# Patient Record
Sex: MALE | Race: WHITE | NOT HISPANIC OR LATINO | Employment: OTHER | URBAN - METROPOLITAN AREA
[De-identification: names, ages, dates, MRNs, and addresses within clinical notes are randomized per-mention and may not be internally consistent; named-entity substitution may affect disease eponyms.]

---

## 2018-04-07 ENCOUNTER — OFFICE VISIT (OUTPATIENT)
Dept: URGENT CARE | Facility: URGENT CARE | Age: 83
End: 2018-04-07
Payer: COMMERCIAL

## 2018-04-07 VITALS
OXYGEN SATURATION: 94 % | TEMPERATURE: 98.3 F | SYSTOLIC BLOOD PRESSURE: 152 MMHG | HEART RATE: 71 BPM | WEIGHT: 215 LBS | BODY MASS INDEX: 32.69 KG/M2 | DIASTOLIC BLOOD PRESSURE: 82 MMHG

## 2018-04-07 DIAGNOSIS — R07.0 THROAT PAIN: Primary | ICD-10-CM

## 2018-04-07 LAB
DEPRECATED S PYO AG THROAT QL EIA: NORMAL
SPECIMEN SOURCE: NORMAL

## 2018-04-07 PROCEDURE — 99203 OFFICE O/P NEW LOW 30 MIN: CPT | Performed by: NURSE PRACTITIONER

## 2018-04-07 PROCEDURE — 87880 STREP A ASSAY W/OPTIC: CPT | Performed by: NURSE PRACTITIONER

## 2018-04-07 PROCEDURE — 87081 CULTURE SCREEN ONLY: CPT | Performed by: NURSE PRACTITIONER

## 2018-04-07 ASSESSMENT — ENCOUNTER SYMPTOMS
SORE THROAT: 1
HEARTBURN: 0
COUGH: 1
FEVER: 0

## 2018-04-07 NOTE — PROGRESS NOTES
HPI      Chief Complaint   Patient presents with     Urgent Care     Pharyngitis     sore throat,cold     2 nights ago started with ST  Last night pain was bad  This morning is a lot better  No fevers  No cough   No heartburn   This sore throat is common for his annual cold. Hasn't had a cold yet this year       Past Medical History:   Diagnosis Date     Arthritis      Past Surgical History:   Procedure Laterality Date     BACK SURGERY       Social History   Substance Use Topics     Smoking status: Never Smoker     Smokeless tobacco: Not on file     Alcohol use Yes      Comment: 2-3 drinks a week     Current Outpatient Prescriptions   Medication Sig Dispense Refill     ZOLPIDEM TARTRATE PO Take 5 mg by mouth.       ASPIRIN PO Take 81 mg by mouth.       multivitamin, therapeutic with minerals (MULTI-VITAMIN) TABS Take 1 tablet by mouth daily.       GLUCOSAMINE SULFATE PO Take  by mouth.       No Known Allergies    Reviewed and updated as needed this visit by clinical staff and provider       Review of Systems   Constitutional: Negative for fever.   HENT: Positive for sore throat. Negative for congestion.    Respiratory: Positive for cough.    Gastrointestinal: Negative for heartburn.         /82  Pulse 71  Temp 98.3  F (36.8  C) (Oral)  Wt 215 lb (97.5 kg)  SpO2 94%  BMI 32.69 kg/m2      Physical Exam   Constitutional: He is well-developed, well-nourished, and in no distress.   HENT:   Head: Normocephalic.   Right Ear: Tympanic membrane, external ear and ear canal normal.   Left Ear: Tympanic membrane, external ear and ear canal normal.   Mouth/Throat: Posterior oropharyngeal erythema present. No oropharyngeal exudate.   Mild uvular swelling   Eyes: Conjunctivae are normal.   Neck: Normal range of motion.   Cardiovascular: Normal rate, regular rhythm and normal heart sounds.    No murmur heard.  Pulmonary/Chest: Effort normal and breath sounds normal. No respiratory distress.   Musculoskeletal: Normal  range of motion.   Lymphadenopathy:     He has no cervical adenopathy.   Neurological: He is alert.   Skin: Skin is warm and dry.   Psychiatric: Mood and affect normal.   Vitals reviewed.      Assessment and Plan:       ICD-10-CM    1. Throat pain R07.0 Rapid strep screen     Beta strep group A culture       I suspect throat pain today is viral. Neg RST. Pain is better today which is reassuring. Take tylenol prn. Push fluids. Warm salt water gargles. F/u with pcp if symptoms worsen.       Kate Gallo, APRN, CNP  [facility]

## 2018-04-07 NOTE — MR AVS SNAPSHOT
"              After Visit Summary   2018    José Miguel Hightower    MRN: [id number]           Patient Information     Date Of Birth          1935        Visit Information        Provider Department      2018 8:05 AM Kate Gallo APRN CNP [facility]        Today's Diagnoses     Throat pain    -  1       Follow-ups after your visit        Who to contact     If you have questions or need follow up information about today's clinic visit or your schedule please contact [facility] directly at [phone].  Normal or non-critical lab and imaging results will be communicated to you by [employer], letter or phone within 4 business days after the clinic has received the results. If you do not hear from us within 7 days, please contact the clinic through Zopah[employer] or phone. If you have a critical or abnormal lab result, we will notify you by phone as soon as possible.  Submit refill requests through [employer] or call your pharmacy and they will forward the refill request to us. Please allow 3 business days for your refill to be completed.          Additional Information About Your Visit        MyChart Information     [employer] lets you send messages to your doctor, view your test results, renew your prescriptions, schedule appointments and more. To sign up, go to [email or web address] . Click on \"Log in\" on the left side of the screen, which will take you to the Welcome page. Then click on \"Sign up Now\" on the right side of the page.     You will be asked to enter the access code listed below, as well as some personal information. Please follow the directions to create your username and password.     Your access code is: [email or web address]  Expires: 2018  8:49 AM     Your access code will  in 90 days. If you need help or a new code, please call your [facility] or [phone].        Care EveryWhere ID     This is your Care EveryWhere ID. This could be used " by other organizations to access your [place] medical records  [id number]        Your Vitals Were     Pulse Temperature Pulse Oximetry BMI (Body Mass Index)          71 98.3  F (36.8  C) (Oral) 94% 32.69 kg/m2         Blood Pressure from Last 3 Encounters:   04/07/18 152/82   03/06/13 127/81    Weight from Last 3 Encounters:   04/07/18 215 lb (97.5 kg)   03/06/13 222 lb (100.7 kg)              We Performed the Following     Beta strep group A culture     Rapid strep screen        Primary Care Provider Office Phone # Fax #    [facility] [phone]       XXX RETIRED  [address] [facility] [address]        Equal Access to Services     ALESIA DEL CID : Hadii imtiaz ferrello Soquynh, waaxda luqadaha, qaybta kaalmada adeegyada, kayla ryan . So [facility] [phone].    ATENCIÓN: Si habla español, tiene a tafoya disposición servicios gratuitos de asistencia lingüística. [facility] [phone].    We comply with applicable federal civil rights laws and [place] laws. We do not discriminate on the basis of race, color, national origin, age, disability, sex, sexual orientation, or gender identity.            Thank you!     Thank you for choosing [facility]ARE  for your care. Our goal is always to provide you with excellent care. Hearing back from our patients is one way we can continue to improve our services. Please take a few minutes to complete the written survey that you may receive in the mail after your visit with us. Thank you!             Your Updated Medication List - Protect others around you: Learn how to safely use, store and throw away your medicines at www.disposemymeds.org.          This list is accurate as of 4/7/18  8:49 AM.  Always use your most recent med list.                   Brand Name Dispense Instructions for use Diagnosis    ASPIRIN PO      Take 81 mg by mouth.        GLUCOSAMINE SULFATE PO      Take  by mouth.         Multi-vitamin Tabs tablet      Take 1 tablet by mouth daily.        ZOLPIDEM TARTRATE PO      Take 5 mg by mouth.

## 2018-04-07 NOTE — NURSING NOTE
"Chief Complaint   Patient presents with     Urgent Care     Pharyngitis     sore throat,cold       Initial /82  Pulse 71  Temp 98.3  F (36.8  C) (Oral)  Wt 215 lb (97.5 kg)  SpO2 94%  BMI 32.69 kg/m2 Estimated body mass index is 32.69 kg/(m^2) as calculated from the following:    Height as of 3/6/13: 5' 8\" (1.727 m).    Weight as of this encounter: 215 lb (97.5 kg).  Medication Reconciliation: complete   Toña DAMIAN MA       "

## 2018-04-08 LAB
BACTERIA SPEC CULT: NORMAL
SPECIMEN SOURCE: NORMAL

## 2020-03-06 ENCOUNTER — HOSPITAL ENCOUNTER (OUTPATIENT)
Facility: CLINIC | Age: 85
End: 2020-03-06
Attending: ORTHOPAEDIC SURGERY | Admitting: ORTHOPAEDIC SURGERY
Payer: COMMERCIAL

## 2020-10-25 DIAGNOSIS — Z11.59 ENCOUNTER FOR SCREENING FOR OTHER VIRAL DISEASES: Primary | ICD-10-CM

## 2021-05-17 ENCOUNTER — HOSPITAL ENCOUNTER (OUTPATIENT)
Facility: CLINIC | Age: 86
End: 2021-05-17
Attending: ORTHOPAEDIC SURGERY | Admitting: ORTHOPAEDIC SURGERY
Payer: MEDICARE

## 2021-06-13 DIAGNOSIS — Z11.59 ENCOUNTER FOR SCREENING FOR OTHER VIRAL DISEASES: ICD-10-CM

## 2021-07-29 ENCOUNTER — HOSPITAL ENCOUNTER (OUTPATIENT)
Facility: CLINIC | Age: 86
End: 2021-07-29
Attending: ORTHOPAEDIC SURGERY | Admitting: ORTHOPAEDIC SURGERY
Payer: MEDICARE

## 2021-08-03 NOTE — PLAN OF CARE
Your Elective TKA Patient:    Patient Name:  José Miguel Hightower    : 1935    MRN: [id number]    Surgeon: LIMA Winter    DOS/Procedure:  2021/TKA    Hospital: [facility]    Insurance - [employer] plan    PCP- Brianna Boyer MD         Assessment Findings: Will update with additional concerns from the H&P as indicated  Medical              Age 85 (1 pt)              BMI=31.78              TABITHA (1 pt)              Paroxysmal A Fib - managed with Sotalol              Functional              Currently Ambulation - Ambulates with Cane              Met score - limited activity related to pain in both knees              Falls - o         Living Situation              Stairs to enter home -   2 story home - steps to bedroom (3 steps into home - 11 steps to second floor to bedroom              Bathroom setup - Tub shower - grab bars - raised toilet              Post Op Support/Resources              Support -  lives with spouse- but she is unable to help him - he does all the cooking and cleaning              Transportation - Son will proved transportation         Discharge Disposition              TCU    1.     [facility] -wants private - he is aware of the private room fee    2.     [facility]              Please direct questions to:    Zena Manriquez RN         [facility]    [place]    [address]   [place] [address]    T: [phone]    C. [phone]    F: [phone]    E: [email or web address]

## 2021-10-22 ENCOUNTER — HOSPITAL ENCOUNTER (OUTPATIENT)
Facility: CLINIC | Age: 86
End: 2021-10-22
Attending: ORTHOPAEDIC SURGERY | Admitting: ORTHOPAEDIC SURGERY
Payer: COMMERCIAL

## 2021-10-29 NOTE — PLAN OF CARE
Your Elective TKA Patient:  Patient Name:  José Miguel Hightower  : 1935  MRN: [id number]  Surgeon: LIMA Winter  DOS/Procedure:  2021/TKA  Hospital: [facility]  Insurance - [employer] plan  PCP- Brianna Boyer MD     Assessment Findings: Will update with additional concerns from the H&P as indicated  Medical            Age 85 (1 pt)            BMI=31.78            TABITHA (1 pt)            Paroxysmal A Fib - managed with Sotalol        Functional            Currently Ambulation - Ambulates with Cane            Met score - limited activity related to pain in both knees            Falls - o     Living Situation            Stairs to enter home -   2 story home - steps to bedroom (3 steps into home - 11 steps to second floor to bedroom            Bathroom setup - Tub shower - grab bars - raised toilet        Post Op Support/Resources            Support -  lives with spouse- but she is unable to help him - he does all the cooking and cleaning            Transportation - Son will proved transportation     Discharge Disposition            TCU  1.     [facility] -wants private - he is aware of the private room fee  2.     Inter[facility]ude [facility]    Please direct questions or concerns to:  Zena Manriquez RN  Trauma Coordinator     [facility]  [place]  [address]   [place] [address]  T: [phone]  C. [phone]  F: [phone]  E: [email or web address]

## 2021-11-16 DIAGNOSIS — Z11.59 ENCOUNTER FOR SCREENING FOR OTHER VIRAL DISEASES: ICD-10-CM

## 2022-02-14 ENCOUNTER — APPOINTMENT (OUTPATIENT)
Dept: URBAN - METROPOLITAN AREA CLINIC 256 | Age: 87
Setting detail: DERMATOLOGY
End: 2022-02-14

## 2022-02-14 VITALS — RESPIRATION RATE: 16 BRPM | HEIGHT: 68 IN | WEIGHT: 200 LBS

## 2022-02-14 DIAGNOSIS — Z85.828 PERSONAL HISTORY OF OTHER MALIGNANT NEOPLASM OF SKIN: ICD-10-CM

## 2022-02-14 DIAGNOSIS — D69.2 OTHER NONTHROMBOCYTOPENIC PURPURA: ICD-10-CM

## 2022-02-14 PROCEDURE — OTHER COUNSELING: OTHER

## 2022-02-14 PROCEDURE — OTHER ADDITIONAL NOTES: OTHER

## 2022-02-14 PROCEDURE — 99202 OFFICE O/P NEW SF 15 MIN: CPT

## 2022-02-14 ASSESSMENT — LOCATION SIMPLE DESCRIPTION DERM: LOCATION SIMPLE: RIGHT HAND

## 2022-02-14 ASSESSMENT — LOCATION ZONE DERM: LOCATION ZONE: HAND

## 2022-02-14 ASSESSMENT — LOCATION DETAILED DESCRIPTION DERM: LOCATION DETAILED: RIGHT ULNAR DORSAL HAND

## 2022-02-14 NOTE — HPI: SKIN LESION
Additional History: Patient had Bx at outside clinic at [facility] with Dr. Britt. Biopsy positive for SCC. Patient referred for Mohs surgery with Dr. Brito. Patient states he is unhappy and is here for a 2nd opinion.

## 2022-02-14 NOTE — PROCEDURE: ADDITIONAL NOTES
Detail Level: Zone
Additional Notes: Patient had Bx at outside clinic at [facility] with Dr. Britt. Biopsy positive for SCC. Patient referred for Mohs surgery with Dr. Brito. Patient states he is unhappy and is here for a 2nd opinion.
Render Risk Assessment In Note?: no

## 2022-05-16 ENCOUNTER — HOSPITAL ENCOUNTER (OUTPATIENT)
Facility: CLINIC | Age: 87
End: 2022-05-16
Attending: ORTHOPAEDIC SURGERY | Admitting: ORTHOPAEDIC SURGERY
Payer: COMMERCIAL

## 2022-05-17 NOTE — PLAN OF CARE
José Miguel Hightower   : 39   Scheduled for Rt TKA on 22 with Dr. Bravo.     He has requested [facility], and I have sent this request through Lisa. He states he has family to stay with him, but they re unable to drive him to/from appts etc.     Please let me know if you need anything further from me.    Thank you,     Carlene Betancur  Care Coordinator to Dr. Bravo    [facility]  [address] [address]  T: [phone]  E: [email or web address]

## 2022-06-29 ENCOUNTER — HOSPITAL ENCOUNTER (OUTPATIENT)
Facility: CLINIC | Age: 87
End: 2022-06-29
Attending: ORTHOPAEDIC SURGERY | Admitting: ORTHOPAEDIC SURGERY
Payer: COMMERCIAL

## 2022-10-23 ENCOUNTER — HEALTH MAINTENANCE LETTER (OUTPATIENT)
Age: 87
End: 2022-10-23

## 2023-08-31 ENCOUNTER — HOSPITAL ENCOUNTER (OUTPATIENT)
Facility: CLINIC | Age: 88
End: 2023-08-31
Attending: ORTHOPAEDIC SURGERY | Admitting: ORTHOPAEDIC SURGERY
Payer: COMMERCIAL

## 2023-11-05 ENCOUNTER — HEALTH MAINTENANCE LETTER (OUTPATIENT)
Age: 88
End: 2023-11-05

## 2023-11-21 ENCOUNTER — HOSPITAL ENCOUNTER (OUTPATIENT)
Facility: CLINIC | Age: 88
Discharge: HOME | End: 2023-11-21
Attending: ORTHOPAEDIC SURGERY | Admitting: ORTHOPAEDIC SURGERY
Payer: COMMERCIAL

## 2023-12-06 ENCOUNTER — HOSPITAL ENCOUNTER (EMERGENCY)
Facility: CLINIC | Age: 88
Discharge: HOME | End: 2023-12-06
Attending: STUDENT IN AN ORGANIZED HEALTH CARE EDUCATION/TRAINING PROGRAM | Admitting: STUDENT IN AN ORGANIZED HEALTH CARE EDUCATION/TRAINING PROGRAM
Payer: COMMERCIAL

## 2023-12-06 VITALS
TEMPERATURE: 97.7 F | RESPIRATION RATE: 16 BRPM | HEART RATE: 92 BPM | SYSTOLIC BLOOD PRESSURE: 122 MMHG | DIASTOLIC BLOOD PRESSURE: 64 MMHG | OXYGEN SATURATION: 92 %

## 2023-12-06 DIAGNOSIS — K52.9 ACUTE GASTROENTERITIS: ICD-10-CM

## 2023-12-06 LAB
ALBUMIN SERPL BCG-MCNC: 4 G/DL (ref 3.5–5.2)
ALP SERPL-CCNC: 125 U/L (ref 40–150)
ALT SERPL W P-5'-P-CCNC: 67 U/L (ref 0–70)
ANION GAP SERPL CALCULATED.3IONS-SCNC: 14 MMOL/L (ref 7–15)
AST SERPL W P-5'-P-CCNC: 57 U/L (ref 0–45)
BASOPHILS # BLD AUTO: 0 10E3/UL (ref 0–0.2)
BASOPHILS NFR BLD AUTO: 0 %
BILIRUB SERPL-MCNC: 1.1 MG/DL
BUN SERPL-MCNC: 21.1 MG/DL (ref 8–23)
CALCIUM SERPL-MCNC: 8.8 MG/DL (ref 8.8–10.2)
CHLORIDE SERPL-SCNC: 105 MMOL/L (ref 98–107)
CREAT SERPL-MCNC: 1.05 MG/DL (ref 0.67–1.17)
DEPRECATED HCO3 PLAS-SCNC: 22 MMOL/L (ref 22–29)
EGFRCR SERPLBLD CKD-EPI 2021: 70 ML/MIN/1.73M2
EOSINOPHIL # BLD AUTO: 0 10E3/UL (ref 0–0.7)
EOSINOPHIL NFR BLD AUTO: 0 %
ERYTHROCYTE [DISTWIDTH] IN BLOOD BY AUTOMATED COUNT: 13.5 % (ref 10–15)
GLUCOSE SERPL-MCNC: 179 MG/DL (ref 70–99)
HCT VFR BLD AUTO: 48.7 % (ref 40–53)
HGB BLD-MCNC: 16.4 G/DL (ref 13.3–17.7)
HOLD SPECIMEN: NORMAL
IMM GRANULOCYTES # BLD: 0 10E3/UL
IMM GRANULOCYTES NFR BLD: 0 %
LIPASE SERPL-CCNC: 17 U/L (ref 13–60)
LYMPHOCYTES # BLD AUTO: 0.1 10E3/UL (ref 0.8–5.3)
LYMPHOCYTES NFR BLD AUTO: 2 %
MAGNESIUM SERPL-MCNC: 1.9 MG/DL (ref 1.7–2.3)
MCH RBC QN AUTO: 31.4 PG (ref 26.5–33)
MCHC RBC AUTO-ENTMCNC: 33.7 G/DL (ref 31.5–36.5)
MCV RBC AUTO: 93 FL (ref 78–100)
MONOCYTES # BLD AUTO: 0.4 10E3/UL (ref 0–1.3)
MONOCYTES NFR BLD AUTO: 6 %
NEUTROPHILS # BLD AUTO: 5.7 10E3/UL (ref 1.6–8.3)
NEUTROPHILS NFR BLD AUTO: 92 %
NRBC # BLD AUTO: 0 10E3/UL
NRBC BLD AUTO-RTO: 0 /100
PLATELET # BLD AUTO: 128 10E3/UL (ref 150–450)
POTASSIUM SERPL-SCNC: 4.3 MMOL/L (ref 3.4–5.3)
PROT SERPL-MCNC: 7 G/DL (ref 6.4–8.3)
RBC # BLD AUTO: 5.22 10E6/UL (ref 4.4–5.9)
SODIUM SERPL-SCNC: 141 MMOL/L (ref 135–145)
WBC # BLD AUTO: 6.3 10E3/UL (ref 4–11)

## 2023-12-06 PROCEDURE — 99284 EMERGENCY DEPT VISIT MOD MDM: CPT | Mod: 25

## 2023-12-06 PROCEDURE — 80053 COMPREHEN METABOLIC PANEL: CPT | Performed by: STUDENT IN AN ORGANIZED HEALTH CARE EDUCATION/TRAINING PROGRAM

## 2023-12-06 PROCEDURE — 96374 THER/PROPH/DIAG INJ IV PUSH: CPT

## 2023-12-06 PROCEDURE — 96361 HYDRATE IV INFUSION ADD-ON: CPT

## 2023-12-06 PROCEDURE — 36415 COLL VENOUS BLD VENIPUNCTURE: CPT | Performed by: STUDENT IN AN ORGANIZED HEALTH CARE EDUCATION/TRAINING PROGRAM

## 2023-12-06 PROCEDURE — 258N000003 HC RX IP 258 OP 636: Performed by: STUDENT IN AN ORGANIZED HEALTH CARE EDUCATION/TRAINING PROGRAM

## 2023-12-06 PROCEDURE — 83735 ASSAY OF MAGNESIUM: CPT | Performed by: STUDENT IN AN ORGANIZED HEALTH CARE EDUCATION/TRAINING PROGRAM

## 2023-12-06 PROCEDURE — 85025 COMPLETE CBC W/AUTO DIFF WBC: CPT | Performed by: STUDENT IN AN ORGANIZED HEALTH CARE EDUCATION/TRAINING PROGRAM

## 2023-12-06 PROCEDURE — 250N000011 HC RX IP 250 OP 636: Mod: JZ | Performed by: STUDENT IN AN ORGANIZED HEALTH CARE EDUCATION/TRAINING PROGRAM

## 2023-12-06 PROCEDURE — 83690 ASSAY OF LIPASE: CPT | Performed by: STUDENT IN AN ORGANIZED HEALTH CARE EDUCATION/TRAINING PROGRAM

## 2023-12-06 RX ORDER — ONDANSETRON 4 MG/1
4 TABLET, ORALLY DISINTEGRATING ORAL EVERY 8 HOURS PRN
Qty: 12 TABLET | Refills: 0 | Status: SHIPPED | OUTPATIENT
Start: 2023-12-06

## 2023-12-06 RX ORDER — ONDANSETRON 2 MG/ML
4 INJECTION INTRAMUSCULAR; INTRAVENOUS ONCE
Status: COMPLETED | OUTPATIENT
Start: 2023-12-06 | End: 2023-12-06

## 2023-12-06 RX ADMIN — ONDANSETRON 4 MG: 2 INJECTION INTRAMUSCULAR; INTRAVENOUS at 01:01

## 2023-12-06 RX ADMIN — SODIUM CHLORIDE, POTASSIUM CHLORIDE, SODIUM LACTATE AND CALCIUM CHLORIDE 1000 ML: 600; 310; 30; 20 INJECTION, SOLUTION INTRAVENOUS at 01:01

## 2023-12-06 ASSESSMENT — ACTIVITIES OF DAILY LIVING (ADL)
ADLS_ACUITY_SCORE: 35

## 2023-12-06 NOTE — ED TRIAGE NOTES
"Pt c/o nause/vomiting/diarrhea since 530PM today. Waited to call EMS \"until it wasn't busy\". EMS gave 4mg of zofran.        "

## 2023-12-06 NOTE — ED PROVIDER NOTES
History     Chief Complaint:  Nausea, Vomiting, & Diarrhea       HPI   José Miguel Hightower is a 87 year old male who presents to the ER for evaluation of nausea, vomiting, diarrhea since this afternoon around noon.  It started within 30 minutes of eating a mussel soup for lunch.  Began with nausea and nonbloody vomiting followed by several episodes of loose stool.  He has no associated abdominal pain.  No fevers, chills, chest pain, shortness of breath.  He felt generally weak so called 911 for assistance.  Lives alone.  No one else ate similar food as him.  Denies recent antibiotic use or sick contacts.      Independent Historian:   None - Patient Only    Review of External Notes:   None       Medications:    ondansetron (ZOFRAN ODT) 4 MG ODT tab  ASPIRIN PO  GLUCOSAMINE SULFATE PO  multivitamin, therapeutic with minerals (MULTI-VITAMIN) TABS  ZOLPIDEM TARTRATE PO        Past Medical History:    Past Medical History:   Diagnosis Date    Arthritis        Past Surgical History:    Past Surgical History:   Procedure Laterality Date    BACK SURGERY          Physical Exam   Patient Vitals for the past 24 hrs:   BP Temp Temp src Pulse Resp SpO2   12/06/23 0041 -- 97.7  F (36.5  C) Temporal -- 16 --   12/06/23 0037 -- -- -- -- -- 94 %   12/06/23 0036 -- -- -- -- -- 95 %   12/06/23 0035 127/62 -- -- 95 -- 94 %        Physical Exam  General: Awake, alert, in no acute distress   HEENT: Atraumatic   EOM normal   External ears normal   Trachea midline  Dry mucous membranes  Neck: Supple, normal ROM  CV: Regular rate, regular rhythm   No murmur   No lower extremity edema  2+ radial and DP pulses  PULM: Breath sounds normal bilaterally  No wheezes or rales  ABD: Soft, non-tender, non-distended  Normal bowel sounds   No rebound or guarding   MSK: No gross deformities  NEURO: Alert, no focal deficits  Skin: Warm, dry and intact      Emergency Department Course     Laboratory:  Labs Ordered and Resulted from Time of ED Arrival to  Time of ED Departure   COMPREHENSIVE METABOLIC PANEL - Abnormal       Result Value    Sodium 141      Potassium 4.3      Carbon Dioxide (CO2) 22      Anion Gap 14      Urea Nitrogen 21.1      Creatinine 1.05      GFR Estimate 70      Calcium 8.8      Chloride 105      Glucose 179 (*)     Alkaline Phosphatase 125      AST 57 (*)     ALT 67      Protein Total 7.0      Albumin 4.0      Bilirubin Total 1.1     CBC WITH PLATELETS AND DIFFERENTIAL - Abnormal    WBC Count 6.3      RBC Count 5.22      Hemoglobin 16.4      Hematocrit 48.7      MCV 93      MCH 31.4      MCHC 33.7      RDW 13.5      Platelet Count 128 (*)     % Neutrophils 92      % Lymphocytes 2      % Monocytes 6      % Eosinophils 0      % Basophils 0      % Immature Granulocytes 0      NRBCs per 100 WBC 0      Absolute Neutrophils 5.7      Absolute Lymphocytes 0.1 (*)     Absolute Monocytes 0.4      Absolute Eosinophils 0.0      Absolute Basophils 0.0      Absolute Immature Granulocytes 0.0      Absolute NRBCs 0.0     LIPASE - Normal    Lipase 17     MAGNESIUM - Normal    Magnesium 1.9          Procedures   None    Emergency Department Course & Assessments:             Interventions:  Medications   ondansetron (ZOFRAN) injection 4 mg (4 mg Intravenous $Given 12/6/23 0101)   lactated ringers BOLUS 1,000 mL (1,000 mLs Intravenous $New Bag 12/6/23 0101)        Assessments:  See ED course     Independent Interpretation (X-rays, CTs, rhythm strip):  None    Consultations/Discussion of Management or Tests:  None   ED Course as of 12/06/23 0214   Wed Dec 06, 2023   0201 Reevaluation.  Patient feeling improved.  Tolerating water.  Will give him crackers.  Discussed that I want him to finish the bag of fluids and then try to get up and walk to make sure he feels steady on his feet.       Social Determinants of Health affecting care:   None    Disposition:  Care of the patient was transferred to my colleague Dr. Camara pending completion of fluids and road test.      Impression & Plan    CMS Diagnoses: None    Medical Decision Making:  Vital stable on arrival.  This is a fairly healthy 87-year-old gentleman who presents with what I suspect is food poisoning given onset of symptoms shortly after eating a seafood soup.  Considered other etiologies such as viral syndrome, diverticulitis, atypical ACS.  Doubt ACS given diarrheal component as well as no chest pain.  Abdominal exam is benign.  Labs show lymphopenia and a mild elevation in AST are otherwise reassuring.  No recent antibiotic use to suggest C. difficile. No urinary symptoms    Patient did appear clinically dehydrated so I want him to finish the liter of fluids.  Thereafter, he will need a road test to make sure he is not symptomatically orthostatic prior to safe discharge home given that the patient lives alone.  He is tolerating p.o. after Zofran I will send an Rx to his preferred pharmacy should he be able to discharge.  If he fails road test, could trial more fluids versus admitted under observation.  Will sign out to my colleague Dr. Camara.      Diagnosis:    ICD-10-CM    1. Acute gastroenteritis  K52.9            Discharge Medications:  New Prescriptions    ONDANSETRON (ZOFRAN ODT) 4 MG ODT TAB    Take 1 tablet (4 mg) by mouth every 8 hours as needed for nausea or vomiting          Christine Barber, DO  12/6/2023   Christine Hernandez, DO        Christine Hernandez, DO  12/06/23 0207       Christine Hernandez, DO  12/06/23 0214

## 2023-12-06 NOTE — ED PROVIDER NOTES
I received patient in signout from Dr. Jacobsen.  Please refer to their complete H&P for further information.  Briefly, patient presented with nausea, vomiting and diarrhea that was concerning for possible food poisoning.  At time of signout, reassessment after parenteral fluid hydration was pending.     Patient finished the parenteral fluids.  He ambulated and reports that he felt steady on his feet, did not have lightheadedness and felt comfortable with discharge home.  He did agree that he would return if he has any of those symptoms or becomes worse.  We help to arrange for wheelchair transport back to his home.  Dr. Jacobsen had already written for antiemetics for him as a home medication.  At this time, with reasonable clinical confidence, I do believe he safe for discharge.     Pauly Camara MD  12/06/23 035

## 2023-12-06 NOTE — ED NOTES
Walked pt down [place]ag[place] about 5 meters. Pt Tolerated well with walker no tech assistance was needed. Pulse 107-109. O2 92%.

## 2023-12-06 NOTE — DISCHARGE INSTRUCTIONS
*Drink plenty of fluids and advance diet slowly.  *Take medications as prescribed.  Zofran for nausea. Continue your current medications.  *Follow-up with your doctor for a recheck in 2-3 days.  *Return if you are unable to keep fluids down, develop severe abdominal pain, faint or feel like you will faint or become worse in any way.    Discharge Instructions  Gastroenteritis    You have been seen today for vomiting and diarrhea, called gastroenteritis or the stomach flu. This is usually caused by a virus, but some bacteria, parasites, medicines or other medical conditions can cause similar symptoms. At this time your doctor does not find that your vomiting and diarrhea is a sign of anything dangerous or life-threatening.  However, sometimes the signs of serious illness do not show up right away.  If you have new or worse symptoms, you may need to be seen again in the emergency department or by your primary doctor. Remember that serious problems like appendicitis can look like gastroenteritis at first.       Return to the Emergency Department if:  You keep throwing up and you are not able to keep liquids down.  You feel you are getting dehydrated, such as being very thirsty, not urinating at least every 8-12 hours, or feeling faint or lightheaded.   You develop a new fever, or your fever continues for more than 2 days.  You have belly pain that seems worse than cramps, is in one spot, or is getting worse over time.   You have blood in your vomit or in your diarrhea.  You feel very weak   You are not starting to improve within 24 hours of your visit here    What can I do to help myself?  The most important thing to do is to drink clear liquids.  If you have been vomiting a lot, it is best to have only small, frequent sips of liquids.  Drinking too much at once may cause more vomiting. If you are vomiting often, you must replace minerals, sodium and potassium lost with your illness. Pedialyte  and sports drinks can  help you replace these minerals.  You can also drink clear liquids such as water, weak tea, apple juice, and 7-up. Avoid acid liquids (orange), caffeine (coffee) or alcohol. Do not drink milk until you no longer have diarrhea.  After liquids are staying down, you may start eating mild foods. Soda crackers, toast, plain noodles, gelatin, applesauce and bananas are good first choices.  Avoid foods that have acid, are spicy, fatty or fibrous (such as meats, coarse grains, vegetables). You may start eating these foods again in about 3 days when you are better.  Sometimes treatment includes prescription medicine to prevent nausea and vomiting and to prevent diarrhea. If your doctor prescribes these for you, take them as directed.  Nonprescription medicine is available for the treatment of diarrhea and can be very effective.  If you use it, make sure you use the dose recommended on the package.  Avoid Lomotil. Check with your healthcare provider before you use any medicine for diarrhea.  Don t take ibuprofen, or other nonsteroidal anti-inflammatory medicines without checking with your healthcare provider.      Remember that you can always come back to the Emergency Department if you are not able to see your regular doctor in the amount of time listed above, if you get any new symptoms, or if there is anything that worries you.

## 2023-12-07 ENCOUNTER — APPOINTMENT (OUTPATIENT)
Dept: CT IMAGING | Facility: CLINIC | Age: 88
DRG: 446 | End: 2023-12-07
Attending: EMERGENCY MEDICINE
Payer: COMMERCIAL

## 2023-12-07 ENCOUNTER — HOSPITAL ENCOUNTER (INPATIENT)
Facility: CLINIC | Age: 88
LOS: 2 days | Discharge: HOME | DRG: 446 | End: 2023-12-10
Attending: EMERGENCY MEDICINE | Admitting: HOSPITALIST
Payer: COMMERCIAL

## 2023-12-07 DIAGNOSIS — E80.6 HYPERBILIRUBINEMIA: ICD-10-CM

## 2023-12-07 DIAGNOSIS — R74.8 ELEVATED LIVER ENZYMES: ICD-10-CM

## 2023-12-07 DIAGNOSIS — R10.13 EPIGASTRIC PAIN: ICD-10-CM

## 2023-12-07 DIAGNOSIS — R79.89 ELEVATED TROPONIN: ICD-10-CM

## 2023-12-07 DIAGNOSIS — R93.5 ABNORMAL CT OF THE ABDOMEN: ICD-10-CM

## 2023-12-07 LAB
ALBUMIN SERPL BCG-MCNC: 3.9 G/DL (ref 3.5–5.2)
ALBUMIN UR-MCNC: NEGATIVE MG/DL
ALP SERPL-CCNC: 127 U/L (ref 40–150)
ALT SERPL W P-5'-P-CCNC: 103 U/L (ref 0–70)
ANION GAP SERPL CALCULATED.3IONS-SCNC: 10 MMOL/L (ref 7–15)
APPEARANCE UR: CLEAR
AST SERPL W P-5'-P-CCNC: 152 U/L (ref 0–45)
ATRIAL RATE - MUSE: 60 BPM
ATRIAL RATE - MUSE: 70 BPM
BASOPHILS # BLD AUTO: 0 10E3/UL (ref 0–0.2)
BASOPHILS NFR BLD AUTO: 0 %
BILIRUB SERPL-MCNC: 1.7 MG/DL
BILIRUB UR QL STRIP: NEGATIVE
BUN SERPL-MCNC: 21.8 MG/DL (ref 8–23)
CALCIUM SERPL-MCNC: 8.6 MG/DL (ref 8.8–10.2)
CHLORIDE SERPL-SCNC: 103 MMOL/L (ref 98–107)
COLOR UR AUTO: YELLOW
CREAT SERPL-MCNC: 1.13 MG/DL (ref 0.67–1.17)
DEPRECATED HCO3 PLAS-SCNC: 27 MMOL/L (ref 22–29)
DIASTOLIC BLOOD PRESSURE - MUSE: NORMAL MMHG
DIASTOLIC BLOOD PRESSURE - MUSE: NORMAL MMHG
EGFRCR SERPLBLD CKD-EPI 2021: 64 ML/MIN/1.73M2
EOSINOPHIL # BLD AUTO: 0 10E3/UL (ref 0–0.7)
EOSINOPHIL NFR BLD AUTO: 0 %
ERYTHROCYTE [DISTWIDTH] IN BLOOD BY AUTOMATED COUNT: 13.6 % (ref 10–15)
GLUCOSE SERPL-MCNC: 135 MG/DL (ref 70–99)
GLUCOSE UR STRIP-MCNC: NEGATIVE MG/DL
HCT VFR BLD AUTO: 44.9 % (ref 40–53)
HGB BLD-MCNC: 14.9 G/DL (ref 13.3–17.7)
HGB UR QL STRIP: ABNORMAL
IMM GRANULOCYTES # BLD: 0 10E3/UL
IMM GRANULOCYTES NFR BLD: 0 %
INTERPRETATION ECG - MUSE: NORMAL
INTERPRETATION ECG - MUSE: NORMAL
KETONES UR STRIP-MCNC: NEGATIVE MG/DL
LACTATE SERPL-SCNC: 1 MMOL/L (ref 0.7–2)
LEUKOCYTE ESTERASE UR QL STRIP: NEGATIVE
LIPASE SERPL-CCNC: 17 U/L (ref 13–60)
LYMPHOCYTES # BLD AUTO: 0.6 10E3/UL (ref 0.8–5.3)
LYMPHOCYTES NFR BLD AUTO: 12 %
MCH RBC QN AUTO: 31.4 PG (ref 26.5–33)
MCHC RBC AUTO-ENTMCNC: 33.2 G/DL (ref 31.5–36.5)
MCV RBC AUTO: 95 FL (ref 78–100)
MONOCYTES # BLD AUTO: 0.8 10E3/UL (ref 0–1.3)
MONOCYTES NFR BLD AUTO: 15 %
NEUTROPHILS # BLD AUTO: 3.9 10E3/UL (ref 1.6–8.3)
NEUTROPHILS NFR BLD AUTO: 73 %
NITRATE UR QL: NEGATIVE
NRBC # BLD AUTO: 0 10E3/UL
NRBC BLD AUTO-RTO: 0 /100
P AXIS - MUSE: 44 DEGREES
P AXIS - MUSE: 61 DEGREES
PH UR STRIP: 6.5 [PH] (ref 5–7)
PLATELET # BLD AUTO: 126 10E3/UL (ref 150–450)
POTASSIUM SERPL-SCNC: 4.4 MMOL/L (ref 3.4–5.3)
PR INTERVAL - MUSE: 192 MS
PR INTERVAL - MUSE: 192 MS
PROT SERPL-MCNC: 6.7 G/DL (ref 6.4–8.3)
QRS DURATION - MUSE: 88 MS
QRS DURATION - MUSE: 94 MS
QT - MUSE: 382 MS
QT - MUSE: 390 MS
QTC - MUSE: 390 MS
QTC - MUSE: 412 MS
R AXIS - MUSE: 31 DEGREES
R AXIS - MUSE: 38 DEGREES
RBC # BLD AUTO: 4.75 10E6/UL (ref 4.4–5.9)
RBC URINE: 1 /HPF
SODIUM SERPL-SCNC: 140 MMOL/L (ref 135–145)
SP GR UR STRIP: 1.01 (ref 1–1.03)
SQUAMOUS EPITHELIAL: 3 /HPF
SYSTOLIC BLOOD PRESSURE - MUSE: NORMAL MMHG
SYSTOLIC BLOOD PRESSURE - MUSE: NORMAL MMHG
T AXIS - MUSE: 72 DEGREES
T AXIS - MUSE: 73 DEGREES
TROPONIN T SERPL HS-MCNC: 23 NG/L
TROPONIN T SERPL HS-MCNC: 28 NG/L
UROBILINOGEN UR STRIP-MCNC: 8 MG/DL
VENTRICULAR RATE- MUSE: 60 BPM
VENTRICULAR RATE- MUSE: 70 BPM
WBC # BLD AUTO: 5.4 10E3/UL (ref 4–11)
WBC URINE: 1 /HPF

## 2023-12-07 PROCEDURE — 250N000013 HC RX MED GY IP 250 OP 250 PS 637: Performed by: HOSPITALIST

## 2023-12-07 PROCEDURE — 81001 URINALYSIS AUTO W/SCOPE: CPT | Performed by: EMERGENCY MEDICINE

## 2023-12-07 PROCEDURE — 83605 ASSAY OF LACTIC ACID: CPT | Performed by: HOSPITALIST

## 2023-12-07 PROCEDURE — 84484 ASSAY OF TROPONIN QUANT: CPT | Performed by: EMERGENCY MEDICINE

## 2023-12-07 PROCEDURE — 96374 THER/PROPH/DIAG INJ IV PUSH: CPT

## 2023-12-07 PROCEDURE — 93005 ELECTROCARDIOGRAM TRACING: CPT

## 2023-12-07 PROCEDURE — 250N000011 HC RX IP 250 OP 636: Performed by: EMERGENCY MEDICINE

## 2023-12-07 PROCEDURE — 99285 EMERGENCY DEPT VISIT HI MDM: CPT | Mod: 25

## 2023-12-07 PROCEDURE — 258N000003 HC RX IP 258 OP 636: Performed by: HOSPITALIST

## 2023-12-07 PROCEDURE — 250N000009 HC RX 250: Performed by: EMERGENCY MEDICINE

## 2023-12-07 PROCEDURE — C9113 INJ PANTOPRAZOLE SODIUM, VIA: HCPCS | Performed by: HOSPITALIST

## 2023-12-07 PROCEDURE — G0378 HOSPITAL OBSERVATION PER HR: HCPCS

## 2023-12-07 PROCEDURE — 85025 COMPLETE CBC W/AUTO DIFF WBC: CPT | Performed by: EMERGENCY MEDICINE

## 2023-12-07 PROCEDURE — 36415 COLL VENOUS BLD VENIPUNCTURE: CPT | Performed by: HOSPITALIST

## 2023-12-07 PROCEDURE — 80053 COMPREHEN METABOLIC PANEL: CPT | Performed by: EMERGENCY MEDICINE

## 2023-12-07 PROCEDURE — 83690 ASSAY OF LIPASE: CPT | Performed by: EMERGENCY MEDICINE

## 2023-12-07 PROCEDURE — 99222 1ST HOSP IP/OBS MODERATE 55: CPT | Performed by: HOSPITALIST

## 2023-12-07 PROCEDURE — 96375 TX/PRO/DX INJ NEW DRUG ADDON: CPT

## 2023-12-07 PROCEDURE — 93005 ELECTROCARDIOGRAM TRACING: CPT | Mod: 76

## 2023-12-07 PROCEDURE — 36415 COLL VENOUS BLD VENIPUNCTURE: CPT | Performed by: EMERGENCY MEDICINE

## 2023-12-07 PROCEDURE — 250N000011 HC RX IP 250 OP 636: Performed by: HOSPITALIST

## 2023-12-07 PROCEDURE — 74177 CT ABD & PELVIS W/CONTRAST: CPT

## 2023-12-07 PROCEDURE — 250N000013 HC RX MED GY IP 250 OP 250 PS 637: Performed by: EMERGENCY MEDICINE

## 2023-12-07 RX ORDER — MAGNESIUM HYDROXIDE/ALUMINUM HYDROXICE/SIMETHICONE 120; 1200; 1200 MG/30ML; MG/30ML; MG/30ML
15 SUSPENSION ORAL ONCE
Status: COMPLETED | OUTPATIENT
Start: 2023-12-07 | End: 2023-12-07

## 2023-12-07 RX ORDER — AMOXICILLIN 250 MG
2 CAPSULE ORAL 2 TIMES DAILY PRN
Status: DISCONTINUED | OUTPATIENT
Start: 2023-12-07 | End: 2023-12-10 | Stop reason: HOSPADM

## 2023-12-07 RX ORDER — ACETAMINOPHEN 650 MG/1
650 SUPPOSITORY RECTAL EVERY 8 HOURS PRN
Status: DISCONTINUED | OUTPATIENT
Start: 2023-12-07 | End: 2023-12-10 | Stop reason: HOSPADM

## 2023-12-07 RX ORDER — ONDANSETRON 4 MG/1
4 TABLET, ORALLY DISINTEGRATING ORAL EVERY 6 HOURS PRN
Status: DISCONTINUED | OUTPATIENT
Start: 2023-12-07 | End: 2023-12-09

## 2023-12-07 RX ORDER — SENNOSIDES 8.6 MG
650 CAPSULE ORAL 3 TIMES DAILY
COMMUNITY

## 2023-12-07 RX ORDER — ONDANSETRON 2 MG/ML
4 INJECTION INTRAMUSCULAR; INTRAVENOUS EVERY 6 HOURS PRN
Status: DISCONTINUED | OUTPATIENT
Start: 2023-12-07 | End: 2023-12-09

## 2023-12-07 RX ORDER — ZOLPIDEM TARTRATE 10 MG/1
3.33 TABLET ORAL
COMMUNITY

## 2023-12-07 RX ORDER — ACETAMINOPHEN 325 MG/1
650 TABLET ORAL EVERY 8 HOURS PRN
Status: DISCONTINUED | OUTPATIENT
Start: 2023-12-07 | End: 2023-12-10 | Stop reason: HOSPADM

## 2023-12-07 RX ORDER — SODIUM CHLORIDE 9 MG/ML
INJECTION, SOLUTION INTRAVENOUS CONTINUOUS
Status: DISCONTINUED | OUTPATIENT
Start: 2023-12-07 | End: 2023-12-10

## 2023-12-07 RX ORDER — IOPAMIDOL 755 MG/ML
100 INJECTION, SOLUTION INTRAVASCULAR ONCE
Status: COMPLETED | OUTPATIENT
Start: 2023-12-07 | End: 2023-12-07

## 2023-12-07 RX ORDER — AMOXICILLIN 250 MG
1 CAPSULE ORAL 2 TIMES DAILY PRN
Status: DISCONTINUED | OUTPATIENT
Start: 2023-12-07 | End: 2023-12-10 | Stop reason: HOSPADM

## 2023-12-07 RX ORDER — HYDROMORPHONE HCL IN WATER/PF 6 MG/30 ML
0.2 PATIENT CONTROLLED ANALGESIA SYRINGE INTRAVENOUS EVERY 4 HOURS PRN
Status: DISCONTINUED | OUTPATIENT
Start: 2023-12-07 | End: 2023-12-10 | Stop reason: HOSPADM

## 2023-12-07 RX ADMIN — PANTOPRAZOLE SODIUM 40 MG: 40 INJECTION, POWDER, FOR SOLUTION INTRAVENOUS at 22:18

## 2023-12-07 RX ADMIN — ALUMINUM HYDROXIDE, MAGNESIUM HYDROXIDE, AND SIMETHICONE 15 ML: 200; 200; 20 SUSPENSION ORAL at 19:51

## 2023-12-07 RX ADMIN — SODIUM CHLORIDE: 9 INJECTION, SOLUTION INTRAVENOUS at 22:56

## 2023-12-07 RX ADMIN — IOPAMIDOL 100 ML: 755 INJECTION, SOLUTION INTRAVENOUS at 19:45

## 2023-12-07 RX ADMIN — SODIUM CHLORIDE 68 ML: 9 INJECTION, SOLUTION INTRAVENOUS at 19:45

## 2023-12-07 RX ADMIN — ZOLPIDEM TARTRATE 2.5 MG: 5 TABLET, FILM COATED ORAL at 22:56

## 2023-12-07 RX ADMIN — HYDROMORPHONE HYDROCHLORIDE 0.2 MG: 0.2 INJECTION, SOLUTION INTRAMUSCULAR; INTRAVENOUS; SUBCUTANEOUS at 22:18

## 2023-12-07 ASSESSMENT — ACTIVITIES OF DAILY LIVING (ADL)
ADLS_ACUITY_SCORE: 37
ADLS_ACUITY_SCORE: 35
ADLS_ACUITY_SCORE: 35

## 2023-12-08 ENCOUNTER — APPOINTMENT (OUTPATIENT)
Dept: CARDIOLOGY | Facility: CLINIC | Age: 88
DRG: 446 | End: 2023-12-08
Attending: HOSPITALIST
Payer: COMMERCIAL

## 2023-12-08 ENCOUNTER — ANESTHESIA (OUTPATIENT)
Dept: SURGERY | Facility: CLINIC | Age: 88
DRG: 446 | End: 2023-12-08
Payer: COMMERCIAL

## 2023-12-08 ENCOUNTER — APPOINTMENT (OUTPATIENT)
Dept: NUCLEAR MEDICINE | Facility: CLINIC | Age: 88
DRG: 446 | End: 2023-12-08
Attending: PHYSICIAN ASSISTANT
Payer: COMMERCIAL

## 2023-12-08 ENCOUNTER — ANESTHESIA EVENT (OUTPATIENT)
Dept: SURGERY | Facility: CLINIC | Age: 88
DRG: 446 | End: 2023-12-08
Payer: COMMERCIAL

## 2023-12-08 ENCOUNTER — APPOINTMENT (OUTPATIENT)
Dept: ULTRASOUND IMAGING | Facility: CLINIC | Age: 88
DRG: 446 | End: 2023-12-08
Attending: HOSPITALIST
Payer: COMMERCIAL

## 2023-12-08 LAB
ALBUMIN SERPL BCG-MCNC: 3.4 G/DL (ref 3.5–5.2)
ALP SERPL-CCNC: 131 U/L (ref 40–150)
ALT SERPL W P-5'-P-CCNC: 109 U/L (ref 0–70)
ANION GAP SERPL CALCULATED.3IONS-SCNC: 8 MMOL/L (ref 7–15)
AST SERPL W P-5'-P-CCNC: 143 U/L (ref 0–45)
BASOPHILS # BLD AUTO: 0 10E3/UL (ref 0–0.2)
BASOPHILS NFR BLD AUTO: 0 %
BILIRUB SERPL-MCNC: 2.6 MG/DL
BUN SERPL-MCNC: 14.6 MG/DL (ref 8–23)
CALCIUM SERPL-MCNC: 8.3 MG/DL (ref 8.8–10.2)
CHLORIDE SERPL-SCNC: 106 MMOL/L (ref 98–107)
CREAT SERPL-MCNC: 0.85 MG/DL (ref 0.67–1.17)
DEPRECATED HCO3 PLAS-SCNC: 26 MMOL/L (ref 22–29)
EGFRCR SERPLBLD CKD-EPI 2021: 85 ML/MIN/1.73M2
EOSINOPHIL # BLD AUTO: 0 10E3/UL (ref 0–0.7)
EOSINOPHIL NFR BLD AUTO: 0 %
ERYTHROCYTE [DISTWIDTH] IN BLOOD BY AUTOMATED COUNT: 13.5 % (ref 10–15)
GLUCOSE SERPL-MCNC: 89 MG/DL (ref 70–99)
HCT VFR BLD AUTO: 40.6 % (ref 40–53)
HGB BLD-MCNC: 13.8 G/DL (ref 13.3–17.7)
IMM GRANULOCYTES # BLD: 0 10E3/UL
IMM GRANULOCYTES NFR BLD: 0 %
LVEF ECHO: NORMAL
LYMPHOCYTES # BLD AUTO: 0.6 10E3/UL (ref 0.8–5.3)
LYMPHOCYTES NFR BLD AUTO: 14 %
MCH RBC QN AUTO: 31.7 PG (ref 26.5–33)
MCHC RBC AUTO-ENTMCNC: 34 G/DL (ref 31.5–36.5)
MCV RBC AUTO: 93 FL (ref 78–100)
MONOCYTES # BLD AUTO: 0.8 10E3/UL (ref 0–1.3)
MONOCYTES NFR BLD AUTO: 18 %
NEUTROPHILS # BLD AUTO: 3.1 10E3/UL (ref 1.6–8.3)
NEUTROPHILS NFR BLD AUTO: 68 %
NRBC # BLD AUTO: 0 10E3/UL
NRBC BLD AUTO-RTO: 0 /100
PLATELET # BLD AUTO: 110 10E3/UL (ref 150–450)
POTASSIUM SERPL-SCNC: 4.2 MMOL/L (ref 3.4–5.3)
PROT SERPL-MCNC: 5.8 G/DL (ref 6.4–8.3)
RBC # BLD AUTO: 4.35 10E6/UL (ref 4.4–5.9)
SODIUM SERPL-SCNC: 140 MMOL/L (ref 135–145)
WBC # BLD AUTO: 4.6 10E3/UL (ref 4–11)

## 2023-12-08 PROCEDURE — C9113 INJ PANTOPRAZOLE SODIUM, VIA: HCPCS | Performed by: HOSPITALIST

## 2023-12-08 PROCEDURE — 250N000013 HC RX MED GY IP 250 OP 250 PS 637: Performed by: HOSPITALIST

## 2023-12-08 PROCEDURE — 36415 COLL VENOUS BLD VENIPUNCTURE: CPT | Performed by: HOSPITALIST

## 2023-12-08 PROCEDURE — 76705 ECHO EXAM OF ABDOMEN: CPT

## 2023-12-08 PROCEDURE — 80053 COMPREHEN METABOLIC PANEL: CPT | Performed by: HOSPITALIST

## 2023-12-08 PROCEDURE — 258N000003 HC RX IP 258 OP 636: Performed by: HOSPITALIST

## 2023-12-08 PROCEDURE — 85025 COMPLETE CBC W/AUTO DIFF WBC: CPT | Performed by: HOSPITALIST

## 2023-12-08 PROCEDURE — 250N000011 HC RX IP 250 OP 636: Performed by: HOSPITALIST

## 2023-12-08 PROCEDURE — 343N000001 HC RX 343: Performed by: HOSPITALIST

## 2023-12-08 PROCEDURE — 99232 SBSQ HOSP IP/OBS MODERATE 35: CPT | Performed by: INTERNAL MEDICINE

## 2023-12-08 PROCEDURE — 78226 HEPATOBILIARY SYSTEM IMAGING: CPT

## 2023-12-08 PROCEDURE — 999N000208 ECHOCARDIOGRAM COMPLETE

## 2023-12-08 PROCEDURE — 99222 1ST HOSP IP/OBS MODERATE 55: CPT | Performed by: PHYSICIAN ASSISTANT

## 2023-12-08 PROCEDURE — 93306 TTE W/DOPPLER COMPLETE: CPT | Mod: 26 | Performed by: INTERNAL MEDICINE

## 2023-12-08 PROCEDURE — A9537 TC99M MEBROFENIN: HCPCS | Performed by: HOSPITALIST

## 2023-12-08 PROCEDURE — 99221 1ST HOSP IP/OBS SF/LOW 40: CPT | Performed by: SURGERY

## 2023-12-08 PROCEDURE — 255N000002 HC RX 255 OP 636: Performed by: HOSPITALIST

## 2023-12-08 PROCEDURE — G0378 HOSPITAL OBSERVATION PER HR: HCPCS

## 2023-12-08 PROCEDURE — 120N000001 HC R&B MED SURG/OB

## 2023-12-08 RX ORDER — NALOXONE HYDROCHLORIDE 0.4 MG/ML
0.4 INJECTION, SOLUTION INTRAMUSCULAR; INTRAVENOUS; SUBCUTANEOUS
Status: DISCONTINUED | OUTPATIENT
Start: 2023-12-08 | End: 2023-12-10 | Stop reason: HOSPADM

## 2023-12-08 RX ORDER — NALOXONE HYDROCHLORIDE 0.4 MG/ML
0.2 INJECTION, SOLUTION INTRAMUSCULAR; INTRAVENOUS; SUBCUTANEOUS
Status: DISCONTINUED | OUTPATIENT
Start: 2023-12-08 | End: 2023-12-10 | Stop reason: HOSPADM

## 2023-12-08 RX ORDER — KIT FOR THE PREPARATION OF TECHNETIUM TC 99M MEBROFENIN 45 MG/10ML
5 INJECTION, POWDER, LYOPHILIZED, FOR SOLUTION INTRAVENOUS ONCE
Status: COMPLETED | OUTPATIENT
Start: 2023-12-08 | End: 2023-12-08

## 2023-12-08 RX ADMIN — MEBROFENIN 7 MILLICURIE: 45 INJECTION, POWDER, LYOPHILIZED, FOR SOLUTION INTRAVENOUS at 09:45

## 2023-12-08 RX ADMIN — ZOLPIDEM TARTRATE 2.5 MG: 5 TABLET, FILM COATED ORAL at 23:14

## 2023-12-08 RX ADMIN — PANTOPRAZOLE SODIUM 40 MG: 40 INJECTION, POWDER, FOR SOLUTION INTRAVENOUS at 08:41

## 2023-12-08 RX ADMIN — SODIUM CHLORIDE: 9 INJECTION, SOLUTION INTRAVENOUS at 13:19

## 2023-12-08 RX ADMIN — HUMAN ALBUMIN MICROSPHERES AND PERFLUTREN 9 ML: 10; .22 INJECTION, SOLUTION INTRAVENOUS at 07:32

## 2023-12-08 ASSESSMENT — ACTIVITIES OF DAILY LIVING (ADL)
ADLS_ACUITY_SCORE: 37
ADLS_ACUITY_SCORE: 35
ADLS_ACUITY_SCORE: 35
ADLS_ACUITY_SCORE: 33
ADLS_ACUITY_SCORE: 35
ADLS_ACUITY_SCORE: 33
ADLS_ACUITY_SCORE: 35
ADLS_ACUITY_SCORE: 33
ADLS_ACUITY_SCORE: 35
ADLS_ACUITY_SCORE: 35

## 2023-12-08 NOTE — CONSULTS
"VASCULAR SURGERY INPATIENT CONSULTATION / Initial In-Patient visit    VASCULAR SURGEON: Dr. 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 He denies having post-prandial pain, denies issues with nausea and vomiting prior to Tuesday when he had one episode. Denies nausea since. Denies abdominal pain with the exception of last evening.     On examination Pat is alert and oriented x4, neurologically intact.   Denies abdominal pain, no tenderness or guarding noted with palpation of all four quadrants.   Pat reports he is hungry and anxious to go home  Denies nausea  Vitals are stable      Lactate Is 1.0, bilirubin has increased from 1.7 at 1900 12/7 to 2.6 this AM    CT scan shows severe stenosis of the celiac artery at the origin due to calcified plaque.    US shows distended gallbladder.     RECOMMENDATION:  I would not recommend intervention (i.e. interventional radiology --angioplasty and stenting) unless patient has ongoing symptoms. He does not need follow-up outpatient. Vascular Surgery will sign off.     HPI:  José Miguel Hightower is a 87 year old male who was seen today in consultation for celiac artery stenosis. Hx of hyperlipidemia, insomnia, who was had an episode of nausea and vomiting on the evening of 12/5. Was then seen in the ER. Suspected to be a virus, discharged back to home. He states he has not felt nauseated or any episodes of emesis since. Denies abdominal pain. He has been doing a bland diet at home, reports eating a muffin around 1 pm on 12/7. At 4 pm he had sharp epigastric pain that \"the pain went from the right to the left in a line.\" Pain did not improve and came back to ED. Received " hydromorphone IV at 9 pm and states the pain went away around 1 am today. Denies nausea, vomiting. He has not had any other pain medications since.     Hx of  hyperlipidemia, insomnia, TABITHA.     PHH:    Past Medical History:   Diagnosis Date    Arthritis          Past Surgical History:   Procedure Laterality Date    BACK SURGERY          ALLERGIES:   No Known Allergies     MEDS:    Current Facility-Administered Medications:     acetaminophen (TYLENOL) tablet 650 mg, 650 mg, Oral, Q8H PRN **OR** acetaminophen (TYLENOL) Suppository 650 mg, 650 mg, Rectal, Q8H PRN, Karen Gaspar MD    HYDROmorphone (DILAUDID) injection 0.2 mg, 0.2 mg, Intravenous, Q4H PRN, Karen Gaspar MD, 0.2 mg at 12/07/23 2218    ondansetron (ZOFRAN ODT) ODT tab 4 mg, 4 mg, Oral, Q6H PRN **OR** ondansetron (ZOFRAN) injection 4 mg, 4 mg, Intravenous, Q6H PRN, Karen Gaspar MD    oxyCODONE IR (ROXICODONE) half-tab 2.5 mg, 2.5 mg, Oral, Q6H PRN, Karen Gaspar MD    pantoprazole (PROTONIX) IV push injection 40 mg, 40 mg, Intravenous, Daily with breakfast, Karen Gaspar MD, 40 mg at 12/07/23 2218    senna-docusate (SENOKOT-S/PERICOLACE) 8.6-50 MG per tablet 1 tablet, 1 tablet, Oral, BID PRN **OR** senna-docusate (SENOKOT-S/PERICOLACE) 8.6-50 MG per tablet 2 tablet, 2 tablet, Oral, BID PRN, Karen Gaspar MD    sodium chloride 0.9 % infusion, , Intravenous, Continuous, Karen Gaspar MD, Last Rate: 100 mL/hr at 12/07/23 2256, New Bag at 12/07/23 2256    zolpidem (AMBIEN) half-tab 2.5 mg, 2.5 mg, Oral, At Bedtime, Karen Gaspar MD, 2.5 mg at 12/07/23 0243    zolpidem (AMBIEN) half-tab 2.5 mg, 2.5 mg, Oral, At Bedtime PRN, Karen Gaspar MD     SOCIAL HABITS:    Tobacco Use      Smoking status: Never      Smokeless tobacco: Not on file     Social History    Substance and Sexual Activity      Alcohol use: Yes        Comment: 2-3 drinks a week       History   Drug Use No        FAMILY HISTORY:    Family History   Problem  "Relation Age of Onset    Cerebrovascular Disease Father     C.A.D. Brother        REVIEW OF SYSTEMS:    A 12 point ROS was reviewed and except for what is listed in the HPI above, all others are negative    PE:    Vital signs:  Temp: 97.9  F (36.6  C) Temp src: Oral BP: (!) 150/67 Pulse: 55   Resp: 18 SpO2: 96 % O2 Device: None (Room air)   Height: 170.2 cm (5' 7\") Weight: 91.8 kg (202 lb 6.4 oz)  Estimated body mass index is 31.7 kg/m  as calculated from the following:    Height as of this encounter: 1.702 m (5' 7\").    Weight as of this encounter: 91.8 kg (202 lb 6.4 oz).       Wt Readings from Last 1 Encounters:   12/08/23 91.8 kg (202 lb 6.4 oz)     Body mass index is 31.7 kg/m .      DIAGNOSTIC STUDIES:     Images:  US Abdomen Limited    Result Date: 12/8/2023  EXAM: US ABDOMEN LIMITED LOCATION: [facility] DATE: 12/8/2023 INDICATION: abd pain, elvated LFT, abnormal CT COMPARISON: None. TECHNIQUE: Limited abdominal ultrasound. FINDINGS: GALLBLADDER: Gallbladder is distended. No gallstones, wall thickening, or pericholecystic fluid. Negative sonographic Rich's sign. BILE DUCTS: No biliary dilatation. The common duct measures 6 mm. LIVER: Left lobe of the liver not well seen secondary to positioning and bowel gas. Normal parenchyma with smooth contour. No focal mass. RIGHT KIDNEY: No hydronephrosis. PANCREAS: Not well seen secondary to bowel gas. The visualized portions are normal. No ascites.     IMPRESSION: 1.  Gallbladder is distended. No gallstones, wall thickening, or pericholecystic fluid. Negative sonographic Rich's sign.  2.  Left lobe of the liver not well seen secondary to positioning and bowel gas. Normal parenchyma with smooth contour. No focal mass. 3.  Right upper quadrant ultrasound otherwise unremarkable.     CT Abdomen Pelvis w Contrast    Result Date: 12/7/2023  EXAM: CT ABDOMEN PELVIS W CONTRAST LOCATION: [facility] DATE: 12/7/2023 " INDICATION: acute epigastric pain, no prior abd surgery COMPARISON: None. TECHNIQUE: CT scan of the abdomen and pelvis was performed following injection of IV contrast. Multiplanar reformats were obtained. Dose reduction techniques were used. CONTRAST: 100 mL Isovue 370 FINDINGS: LOWER CHEST: Bibasilar atelectasis. Coronary calcifications.  HEPATOBILIARY: Lobular liver contour with atrophy of the left lobe suspicious for some degree of fibrosis or cirrhosis. No biliary dilation. Gallbladder distention with positive tensile fundus sign (series 3 image 73).  PANCREAS: Normal.  SPLEEN: Normal.  ADRENAL GLANDS: Normal.  KIDNEYS/BLADDER: Subcentimeter hypoattenuating lesions are too small to characterize. No hydronephrosis. Normal urinary bladder.  BOWEL: No obstruction. Normal appendix. Colonic diverticulosis. No pneumoperitoneum or free fluid. LYMPH NODES: No pathologically enlarged lymph nodes. VASCULATURE: Nonaneurysmal aorta with severe aortoiliac calcifications. 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 Likely at least mild-to-moderate stenosis of the bilateral renal arteries near the origins. Patent portal, splenic, and superior mesenteric veins. PELVIC ORGANS: Prostatomegaly.  MUSCULOSKELETAL: Multilevel degenerative disc disease of the thoracolumbar spine. Small, fat-containing left inguinal hernia.      IMPRESSION: 1.  No acute abnormalities in the abdomen or pelvis. 2.  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 3.  Prostatomegaly. 4.  Chronic and ancillary findings as described    ECHO TRANSTHORACIC COMPLETE    Result Date: 2023  ECHOCARDIOGRAM TIMOTHY RICO MRN:    [id number]          Accession#:   [id number] :    1935 87 years Study Date:   2023 9:50:52 AM Gender: M                   BP:           136/74 mmHg Height: 172.72 cm           BSA:          2.04 mÂ  Weight: 90.26 kg             Tech:         AIDET                             Referring MD: QI VELAZQUEZ Site:             [facility] Reading Location: [facility] Patient Location: Outpatient. Procedure: 2D, Color Doppler and Spectral Doppler. Indication for study: Paroxysmal Atrial Fibrillation Cardiac Rhythm: Normal sinus.Study quality: Final Impressions:  1. Normal LV size, mildly increased wall thickness, normal global systolic function with an estimated EF of 55 - 60%.  2. The aortic valve is sclerotic, no stenosis and trivial regurgitation.  3. The mitral valve is normal, mild mitral regurgitation. Comparison Compared to prior exam images and report of 10/4/22, there has been no significant change.  Chamber Sizes and Function Normal left ventricular size, mildly increased wall thickness, normal global systolic function with an estimated EF of 55 - 60%. Left atrial size is normal. Right ventricular cavity size is normal, global systolic RV function is normal. The right atrium is normal. Right atrial volume index is 26 ml/mÂ . Right atrial area is 19 cmÂ . The pulmonary artery is not well visualized. The sinus of Valsalva is normal sized. The ascending aorta is normal sized. Valves, RV Pressures and Diastolic Function The aortic valve is sclerotic, no stenosis and trivial regurgitation. The mitral valve is normal in structure, mild mitral regurgitation. Normal diastolic function for age. The tricuspid valve is normal in structure. Tricuspid regurgitation is trace regurgitation. The tricuspid regurgitant velocity is 2.8 m/s, the estimated right ventricular systolic pressure is 32 mmHg plus right atrial pressure. The pulmonic valve is not well visualized. Unable to determine pulmonary regurgitation. Masses, Effusion, Shunts There is no pericardial effusion. The inferior vena cava is not well visualized, respiratory size variation not well visualized. No left to right shunting was detected by limited color flow Doppler  interrogation of the interatrial septum. MEASUREMENTS AND CALCULATIONS 2-D Measurements and LV Function: LVID (d) 4.5 cm LV FS% (2D)   21 % LVID (s) 3.6 cm LVOT diameter 2.3 cm IVS (d)  1.3 cm HR            69 bpm LVPW (d) 1.1 cm LA Vol index  28 ml/m2 Ao Sinus 3.7 cm RA Vol index  26 ml/m2 Asc Ao   3.7 cm RA area       19 cmÂ  Diastology: Mitral          Tissue Doppler E Peak 0.4 m/s  e', Septum     0.06 m/s A Peak 0.8 m/s  e', Lateral    0.10 m/s E/A    0.5      E/e' Average   4.85 DT     273 msec Aortic Valve: Vmax       1.1 m/s  LEVI (V)   3.52 cmÂ  VTI        0.23 m   LEVI (I)   3.35 cmÂ  LVOT V max 1.0 m/s  Max PG    5 mmHg LVOT VTI   0.19 m   Mean PG   3 mmHg SV         77 ml    Dim Index 0.81 SV index   38 ml/mÂ  CO        5.3 l/min                     CI        2.6 l/min/mÂ  Mitral Valve: MVA      2.8 cmÂ  MV P 1/2 79 msec Tricuspid Valve and estimated PA pressures: TR Vmax 2.8 m/s TAPSE 2.3 cm TR maxG 32 mmHg  Pulmonic Valve: PV AT 77 msec Electronically signed by Mike Kerr MD on 11/8/2023 10:29:39 AM. This study was interpreted by an [facility] accredited facility.   Final      LABS:      Sodium   Date Value Ref Range Status   12/08/2023 140 135 - 145 mmol/L Final     Comment:     Reference intervals for this test were updated on 09/26/2023 to more accurately reflect our healthy population. There may be differences in the flagging of prior results with similar values performed with this method. Interpretation of those prior results can be made in the context of the updated reference intervals.    12/07/2023 140 135 - 145 mmol/L Final     Comment:     Reference intervals for this test were updated on 09/26/2023 to more accurately reflect our healthy population. There may be differences in the flagging of prior results with similar values performed with this method. Interpretation of those prior results can be made in the context of the updated reference intervals.    12/06/2023 141 135 - 145 mmol/L Final      Comment:     Reference intervals for this test were updated on 09/26/2023 to more accurately reflect our healthy population. There may be differences in the flagging of prior results with similar values performed with this method. Interpretation of those prior results can be made in the context of the updated reference intervals.      Urea Nitrogen   Date Value Ref Range Status   12/08/2023 14.6 8.0 - 23.0 mg/dL Final   12/07/2023 21.8 8.0 - 23.0 mg/dL Final   12/06/2023 21.1 8.0 - 23.0 mg/dL Final     Hemoglobin   Date Value Ref Range Status   12/08/2023 13.8 13.3 - 17.7 g/dL Final   12/07/2023 14.9 13.3 - 17.7 g/dL Final   12/06/2023 16.4 13.3 - 17.7 g/dL Final     Platelet Count   Date Value Ref Range Status   12/08/2023 110 (L) 150 - 450 10e3/uL Final   12/07/2023 126 (L) 150 - 450 10e3/uL Final   12/06/2023 128 (L) 150 - 450 10e3/uL Final       35 min spent on the date of the encounter in chart review, patient visit, review of tests, documentation and/or discussion with other providers about the issues documented above     Trudy Cruz NP  [facility] Vascular Surgery

## 2023-12-08 NOTE — PROGRESS NOTES
[facility]    Hospitalist Progress Note    Date of Service (when I saw the patient): 12/08/2023  Admit date: 12/7/2023    Interval History   Full details of events over last 24 hours outlined below.   Patient denies any pain presently.  Completely resolved.  He has been afebrile hemodynamically stable since admission.  He is a little perturbed as he was just told he had an artery problem and now I come in to talk about a gallbladder problem.  He asked that we communicate and figure out a plan.  Denies any SOB, CP, abdominal pain, N/V/D currently.     Assessment & Plan   José Miguel Hightower is a 87 year old male with hyperlipidemia, insomnia, was brought to the ER due to abdominal pain and registered under observation on 12/7/2023     He had presented previously with nausea and vomiting.  That it improved but then developed upper abdominal pain without fever.  No diarrhea, hematochezia or melena.    At presentation he was afebrile, hypertensive at 179/95, oxygenation 98% on room air, pulse normal    Labs now significant for elevated liver enzymes: , , bilirubin 1.7.  (Bilirubin previously normal AST 57 on 12/6) lactic acid normal at 1.  Troponin 23.   CR 1.13 (baseline Cr ~0.85), lytes normal.  WBC normal at 5.4 platelets mildly low at 126 (baseline ~150)  UA: 1 WBC 1 RBC    CT A/P: No acute abnormalities.  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  RUQ U/S: Distended gallbladder.  No gallstones wall thickening or pericystic fluid.  Negative sonographic Rich sign.  Left lobe of liver not well seen.  Normal parenchyma and smooth contour.      Abdominal pain  Rising liver enzymes and bilirubin  Suspected choledocholithiasis with biliary colic  Was in ER night before with nausea vomiting.  Above improved but now upper abdominal pain without fever.  No diarrhea, hematochezia or melena.  WBC normal.   CT abdomen pelvis showed lobular liver contour with atrophy of left lobe, fibrosis or cirrhosis, distended GB with positive tensile fundus sign. 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     Admitted under observation but given rising bilirubin, cannot discharge within 24 hours and will transition to inpatient  Currently n.p.o.  General surgery consulted  Given rising bilirubin will consult with [facility] as well.  May need ERCP  No signs or symptoms of infection/sepsis.  Currently afebrile without leukocytosis and hemodynamically stable. Therefore, holding on antibiotics, unless GI recommends otherwise due to the potential of developing cholangitis.   If antibiotic started obtained blood cultures x 2 first    Recent Labs   Lab 12/08/23  0644 12/07/23  1958 12/07/23  1847 12/06/23  0046   ALBUMIN 3.4*  --  3.9 4.0   BILITOTAL 2.6*  --  1.7* 1.1   *  --  103* 67   *  --  152* 57*   ALKPHOS 131  --  127 125   PROTEIN  --  Negative  --   --    LIPASE  --   --  17 17   CR 0.85  --  1.13 1.05          Celiac artery stenosis, incidental finding  * Lactic acid normal.     Vascular surgery consulted.   Suspect this is an incidental finding as patient reports no history of postprandial pain, food avoidance, weight loss     Minimally elevated troponin, not consistent with ACS  * No chest pain, or other symptoms to suggest ACS   * EKG sinus and no ischemic changes.     Troponin 28 > 23  Echo ordered     Hyperlipidemia  * Not on statin.      Insomnia  PTA PRN ambien ordered.      TABITHA  * Stopped using CPAP     Clinically Significant Risk Factors Present on Admission          # Hypocalcemia: Lowest Ca = 8.3 mg/dL in last 2 days, will monitor and replace as appropriate     # Hypoalbuminemia: Lowest albumin = 3.4 g/dL at 12/8/2023  6:44 AM, will monitor as appropriate   # Thrombocytopenia: Lowest platelets = 110 in last 2 days, will monitor for  "bleeding        # Obesity: Estimated body mass index is 31.7 kg/m  as calculated from the following:    Height as of this encounter: 1.702 m (5' 7\").    Weight as of this encounter: 91.8 kg (202 lb 6.4 oz).                PT/OT: None  Diet: Orders Placed This Encounter      NPO for Medical/Clinical Reasons Except for: Meds     IVF: Continue normal saline @100 cc/h.  K4.4  DVT Prophylaxis: Up and walking PCD's  Scott Catheter: Not present    Lines: Peripheral only  Cardiac Monitoring: Not needed  Full Code  Disposition:  Anticipate discharge possibly tomorrow depending on liver enzyme trend.   Communication: Discussed with RN, patient on 12/08/23.  Will call vascular surgery and discussed with surgery and gastroenterology later today.    Simi Del Castillo MD    Hospitalist Service  [facility]  Securely message with the Vocera Web Console (learn more here)  Text page via [employer] Paging/Directory    Medical Decision Making       Over 35 MINUTES SPENT BY ME on the date of service doing chart review, history, exam, documentation & further activities per the note.          -Data reviewed today: I reviewed all new labs and imaging results over the last 24 hours. I personally reviewed no images or EKG's today.    Physical Exam   Temp: 97.9  F (36.6  C) Temp src: Oral BP: (!) 150/67 Pulse: 55   Resp: 18 SpO2: 96 % O2 Device: None (Room air)    Vitals:    12/07/23 1838 12/07/23 2242 12/08/23 0645   Weight: 89.8 kg (198 lb) 93.3 kg (205 lb 11 oz) 91.8 kg (202 lb 6.4 oz)     Vital Signs with Ranges  Temp:  [97.9  F (36.6  C)-98.8  F (37.1  C)] 97.9  F (36.6  C)  Pulse:  [55-64] 55  Resp:  [16-18] 18  BP: (147-179)/(67-95) 150/67  SpO2:  [95 %-98 %] 96 %  I/O last 3 completed shifts:  In: -   Out: 400 [Urine:400]    Today's Exam  Constitutional: NAD,   Neuropsyche:  alert and oriented, answers questions appropriately.   Respiratory:  Breathing comfortably, good air exchange, no wheezes, no crackles. "   Cardiovascular:  Regular rate and rhythm, no edema.  GI:  soft, NT/ND, BS normal  Skin/Integumen:  No acute rash or sign of bleeding.     Medications   All medications reviewed on 12/08/23     sodium chloride 100 mL/hr at 12/07/23 2256      pantoprazole  40 mg Intravenous Daily with breakfast    zolpidem  2.5 mg Oral At Bedtime     PRN Meds: acetaminophen **OR** acetaminophen, HYDROmorphone, ondansetron **OR** ondansetron, oxyCODONE IR, senna-docusate **OR** senna-docusate, zolpidem    Data   Recent Labs   Lab 12/08/23  0644 12/07/23  1847 12/06/23  0046   WBC 4.6 5.4 6.3   HGB 13.8 14.9 16.4   MCV 93 95 93   * 126* 128*    140 141   POTASSIUM 4.2 4.4 4.3   CHLORIDE 106 103 105   CO2 26 27 22   BUN 14.6 21.8 21.1   CR 0.85 1.13 1.05   ANIONGAP 8 10 14   EMILE 8.3* 8.6* 8.8   GLC 89 135* 179*   ALBUMIN 3.4* 3.9 4.0   PROTTOTAL 5.8* 6.7 7.0   BILITOTAL 2.6* 1.7* 1.1   ALKPHOS 131 127 125   * 103* 67   * 152* 57*   LIPASE  --  17 17       Recent Results (from the past 24 hour(s))   CT Abdomen Pelvis w Contrast    Narrative    EXAM: CT ABDOMEN PELVIS W CONTRAST  LOCATION: [facility]  DATE: 12/7/2023    INDICATION: acute epigastric pain, no prior abd surgery  COMPARISON: None.  TECHNIQUE: CT scan of the abdomen and pelvis was performed following injection of IV contrast. Multiplanar reformats were obtained. Dose reduction techniques were used.  CONTRAST: 100 mL Isovue 370    FINDINGS:     LOWER CHEST: Bibasilar atelectasis. Coronary calcifications.     HEPATOBILIARY: Lobular liver contour with atrophy of the left lobe suspicious for some degree of fibrosis or cirrhosis. No biliary dilation. Gallbladder distention with positive tensile fundus sign (series 3 image 73).     PANCREAS: Normal.     SPLEEN: Normal.     ADRENAL GLANDS: Normal.     KIDNEYS/BLADDER: Subcentimeter hypoattenuating lesions are too small to characterize. No hydronephrosis. Normal urinary bladder.       BOWEL: No obstruction. Normal appendix. Colonic diverticulosis. No pneumoperitoneum or free fluid.     LYMPH NODES: No pathologically enlarged lymph nodes.    VASCULATURE: Nonaneurysmal aorta with severe aortoiliac calcifications. 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 Likely at least mild-to-moderate stenosis of the bilateral renal arteries near the   origins. Patent portal, splenic, and superior mesenteric veins.     PELVIC ORGANS: Prostatomegaly.     MUSCULOSKELETAL: Multilevel degenerative disc disease of the thoracolumbar spine. Small, fat-containing left inguinal hernia.       Impression    IMPRESSION:  1.  No acute abnormalities in the abdomen or pelvis.  2.  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  3.  Prostatomegaly.  4.  Chronic and ancillary findings as described   US Abdomen Limited    Narrative    EXAM: US ABDOMEN LIMITED  LOCATION: [facility]  DATE: 12/8/2023    INDICATION: abd pain, elvated LFT, abnormal CT  COMPARISON: None.  TECHNIQUE: Limited abdominal ultrasound.    FINDINGS:    GALLBLADDER: Gallbladder is distended. No gallstones, wall thickening, or pericholecystic fluid. Negative sonographic Rihc's sign.    BILE DUCTS: No biliary dilatation. The common duct measures 6 mm.    LIVER: Left lobe of the liver not well seen secondary to positioning and bowel gas. Normal parenchyma with smooth contour. No focal mass.    RIGHT KIDNEY: No hydronephrosis.    PANCREAS: Not well seen secondary to bowel gas. The visualized portions are normal.    No ascites.      Impression    IMPRESSION:  1.  Gallbladder is distended. No gallstones, wall thickening, or pericholecystic fluid. Negative sonographic Rich's sign.      2.  Left lobe of the liver not well seen secondary to positioning and bowel gas. Normal parenchyma with smooth contour. No focal mass.    3.  Right upper  quadrant ultrasound otherwise unremarkable.

## 2023-12-08 NOTE — CONSULTS
GASTROENTEROLOGY CONSULTATION      José Miguel Hightower  [address] [place] [address]  87 year old male     Admission Date/Time: 12/7/2023  Primary Care Provider: Philip Reed     We were asked to see the patient in consultation by Dr. Del Castillo for evaluation of elevated liver panel and rising bilirubin.    CC: Abdominal pain     HPI:  José Miguel Hightower is a 87 year old male hyperlipidemia, insomnia, back surgery was brought to ED for an evaluation of abdominal pain. 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 US showed distended GB, no stones. HIDA scan in process. GI consulted for elevated liver panel with rising bili.     Labs notable for   >143,  >109, T bili 1.7 > 2.6, ALK PHOS 127 > 131, lipase 17, Blood counts notable for mild thrombocytopenia. INR pending.       Patient not in the room. Looks like he is Nuc med for HIDA.      PAST MEDICAL HISTORY:  Patient Active Problem List    Diagnosis Date Noted    Hyperbilirubinemia 12/07/2023     Priority: Medium    Epigastric pain 12/07/2023     Priority: Medium    Elevated liver enzymes 12/07/2023     Priority: Medium    Abnormal CT of the abdomen 12/07/2023     Priority: Medium    Elevated troponin 12/07/2023     Priority: Medium          ROS: A comprehensive ten point review of systems was negative aside from those in mentioned in the HPI.       MEDICATIONS:   Prior to Admission medications    Medication Sig Start Date End Date Taking? Authorizing Provider   acetaminophen (QC ACETAMINOPHEN 8 HOURS) 650 MG CR tablet Take 650 mg by mouth 3 times daily   Yes Unknown, Entered By History   zolpidem (AMBIEN) 10 MG tablet Take 3.33 mg by mouth Twice to three times throughout the night. Patient takes a third of a zolpidem (3.33mg at 2300 and then 0230 and/or 0430)    Patient is ok with taking 2.5mg while hospitalized and then having an additional  "dose on hand if needed since 3.33mg not an available pre-packed strength)   Yes Unknown, Entered By History   ondansetron (ZOFRAN ODT) 4 MG ODT tab Take 1 tablet (4 mg) by mouth every 8 hours as needed for nausea or vomiting 12/6/23   Christine Hernandez DO        ALLERGIES: No Known Allergies     SOCIAL HISTORY:  Social History     Tobacco Use    Smoking status: Never   Substance Use Topics    Alcohol use: Yes     Comment: 2-3 drinks a week    Drug use: No        FAMILY HISTORY:  Family History   Problem Relation Age of Onset    Cerebrovascular Disease Father     C.A.D. Brother         PHYSICAL EXAM:   BP (!) 150/67 (BP Location: Left arm)   Pulse 55   Temp 97.9  F (36.6  C) (Oral)   Resp 18   Ht 1.702 m (5' 7\")   Wt 91.8 kg (202 lb 6.4 oz)   SpO2 96%   BMI 31.70 kg/m          LABS:  I reviewed the patient's new clinical lab test results.   Recent Labs   Lab Test 12/08/23 0644 12/07/23 1847 12/06/23  0046   WBC 4.6 5.4 6.3   HGB 13.8 14.9 16.4   MCV 93 95 93   * 126* 128*     Recent Labs   Lab Test 12/08/23 0644 12/07/23 1847 12/06/23  0046    140 141   POTASSIUM 4.2 4.4 4.3   CHLORIDE 106 103 105   CO2 26 27 22   BUN 14.6 21.8 21.1   ANIONGAP 8 10 14   EMILE 8.3* 8.6* 8.8     Recent Labs   Lab Test 12/08/23 0644 12/07/23 1958 12/07/23 1847 12/06/23  0046   ALBUMIN 3.4*  --  3.9 4.0   BILITOTAL 2.6*  --  1.7* 1.1   *  --  103* 67   *  --  152* 57*   ALKPHOS 131  --  127 125   PROTEIN  --  Negative  --   --    LIPASE  --   --  17 17        IMAGING/PROCEDURES:    US 12/7  IMPRESSION:  1.  Gallbladder is distended. No gallstones, wall thickening, or pericholecystic fluid. Negative sonographic Rich's sign.       2.  Left lobe of the liver not well seen secondary to positioning and bowel gas. Normal parenchyma with smooth contour. No focal mass.     3.  Right upper quadrant ultrasound otherwise unremarkable.    CT 12/7  IMPRESSION:  1.  No acute abnormalities in the abdomen or " pelvis.  2.  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  3.  Prostatomegaly.  4.  Chronic and ancillary findings as described     I personally reviewed the patient's new imaging results.    Problem list pertaining to GI:  Elevated liver panel  Abdominal pain    Assessment: This is a 87 y-o male with abdominal pain. Labs notable for biliary obstruction with elevated bili and liver enzymes. However, no gall stones or biliary dilation or signs of cholecystitis. CT also showed severe stenosis of the celiac artery at its origin due to calcified atherosclerotic plaque. No IR intervention is planned at this time. HIDA scan in progress.      Plan:  - NPO  - EUS +/- ERCP  - Further recommendation after EUS  - Trend liver panel     I will discuss with Dr. Aguilera    Thank you for allowing me to participate in the care of this patient.  Please contact me with any questions or concerns.    Total time spent:  Approximately, 40 minutes was spent providing patient care, including patient evaluation, reviewing documentation/test results, and . Thank you for asking us to participate in the care of this patient.      Odette Yi CNP   [facility] ([facility])  Mobile # [phone]          Staff addendum: 87 yom admitted with upper abd pain and elevated liver tests. Imaging showed 6 mm CBD and no gallstones. Pt currently pain free. Pt claims not to have known about potential EUS. Unable to do EUS/ERCP today due to no OR availability. Pt angry because he does not have a definitive diagnosis yet and wants to go home.     Recs  -Check liver tests in am  -Await HIDA scan to see if any sign of CBD obstruction  -OK from GI standpoint to eat today and NPO after MN in case he needs a procedure tomorrow if he does not go home tonight.    Dr. Rai to round on pt tomorrow    Total time 15 min    Patty Aguilera MD  [facility]  Phone  [phone] until 5 pm  Office [phone] for after hours on call provider

## 2023-12-08 NOTE — PLAN OF CARE
PRIMARY Concern: abdominal pain   SAFETY RISK Concerns (fall risk, behaviors, etc.): Fall      Isolation/Type: None  Tests/Procedures for NEXT shift: AM Labs, ERCP to be done, and second picture in nuclear med to be completed  Consults? (Pending/following, signed-off?) Gen surg, Vascular surg, GI following  Where is patient from? (Home, TCU, etc.): Home  Other Important info for NEXT shift:HIDA scan showed biliary obstruction or possible severe hepatic dysfunction- possible ERCP tomorrow or lap yandy inpt vs this hospital stay  Anticipated DC date & active delays: TBD  _____________________________________________________________________________  SUMMARY NOTE:     Orientation/Cognitive: A/O x4  Observation Goals (Met/ Not Met): inpt  Mobility Level/Assist Equipment: Ax1/GB/W  Antibiotics & Plan (IV/po, length of tx left): None  Pain Management: PRNs available   Tele/VS/O2: VSS on RA, ex HTN  ABNL Lab/BG: Elevated LFTs, Trops flat   Diet: Reg, NPO at MN  Bowel/Bladder: Cont  Skin Concerns: None  Drains/Devices: NS @ 100/hr  Patient Stated Goal for Today: Rest

## 2023-12-08 NOTE — ED PROVIDER NOTES
"History   Chief Complaint:  Abdominal pain    HPI   History supplemented by electronic chart review    José Miguel Hightower is a 87 year old male who presents by EMS for evaluation of epigastric pain.  He was here a day and a half ago for evaluation of nonbloody vomiting and some loose stool after having some muscle soup.  He was treated with fluids and Zofran, had reassuring evaluation including blood test, and was discharged home with a prescription for Zofran which she took once.  He states yesterday he was overall tired but felt he was doing better than when in the ER, and today he was doing well, had a muffin for lunch, and spent 2 PM to 4 PM playing online [employer], short while after that, after getting up from the desk where he had been playing in the computer game, he developed epigastric pain that feels like a \"gas bubble\" and bandlike discomfort across his upper abdomen.  No chest pain.  No fevers.  No recurrent vomiting or diarrhea.  No urinary symptoms.  No trauma.  No falls.  No analgesics taken.  He does not drink alcohol.  He does not take NSAIDs.  He does not think he has ever had an upper endoscopy.  He has never had any abdominal surgeries.  He denies having prior similar episodes after heavy or spicy foods.  No known gallbladder problems.  He would like to know what is causing his symptoms.  He lives alone in a big house ever since his wife  about 6 months ago.    Review of External Notes: I reviewed his prior records, I see that he had an echo on  showing an EF of 55 to 60% as well as aortic valve sclerosis.  He went to clinic today for evaluation and was doing well at the time.    Medications:    ASPIRIN PO  GLUCOSAMINE SULFATE PO  multivitamin, therapeutic with minerals (MULTI-VITAMIN) TABS  ondansetron (ZOFRAN ODT) 4 MG ODT tab  ZOLPIDEM TARTRATE PO    Past Medical History:    Past Medical History:   Diagnosis Date    Arthritis      Physical Exam   Patient Vitals for the past 24 hrs:   " "BP Temp Temp src Pulse Resp SpO2 Height Weight   12/08/23 0645 -- -- -- -- -- -- -- 91.8 kg (202 lb 6.4 oz)   12/08/23 0327 (!) 147/71 98.8  F (37.1  C) Oral 56 16 95 % -- --   12/07/23 2242 (!) 166/77 98.8  F (37.1  C) Oral 62 18 96 % 1.702 m (5' 7\") 93.3 kg (205 lb 11 oz)   12/07/23 2130 (!) 175/86 -- -- 64 16 95 % -- --   12/07/23 1838 (!) 179/95 98.1  F (36.7  C) Temporal 62 16 98 % 1.702 m (5' 7\") 89.8 kg (198 lb)   12/07/23 1836 -- 98.1  F (36.7  C) Temporal -- -- -- -- --      Physical Exam  General: Nontoxic-appearing male sitting upright in room 22  HENT: mucous membranes moist   CV: rate as above, mild symmetric LE edema  Resp: normal effort, speaks in full phrases, no stridor, no cough observed  GI: abdomen soft, minimal epigastric tenderness, no guarding, negative Rich's  MSK: no bony tenderness, no CVAT  Skin: appropriately warm and dry  Neuro: alert, clear speech, oriented   Psych: cooperative    Emergency Department Course   Electrocardiogram  ECG taken at 1912, ECG interpreted at 1924 by MINDI Brizuela MD  Sinus rhythm, no ST elevation or depression  Rate 60 bpm. PA interval 192. QRS duration 94. QTc 390    Electrocardiogram  ECG taken at 2006, ECG interpreted at 2018 by MINDI Brizuela MD  Sinus rhythm, no ST elevation or depression  Rate 70 bpm. PA interval 192. QRS duration 88. QTc 412    Imaging:  CT Abdomen Pelvis w Contrast   Final Result   IMPRESSION:   1.  No acute abnormalities in the abdomen or pelvis.   2.  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   3.  Prostatomegaly.   4.  Chronic and ancillary findings as described      Laboratory:  Labs Ordered and Resulted from Time of ED Arrival to Time of ED Departure   COMPREHENSIVE METABOLIC PANEL - Abnormal       Result Value    Sodium 140      Potassium 4.4      Carbon Dioxide (CO2) 27      Anion Gap 10      Urea Nitrogen 21.8      Creatinine 1.13      GFR Estimate 64      Calcium 8.6 (*)     " Chloride 103      Glucose 135 (*)     Alkaline Phosphatase 127       (*)      (*)     Protein Total 6.7      Albumin 3.9      Bilirubin Total 1.7 (*)    ROUTINE UA WITH MICROSCOPIC - Abnormal    Color Urine Yellow      Appearance Urine Clear      Glucose Urine Negative      Bilirubin Urine Negative      Ketones Urine Negative      Specific Gravity Urine 1.010      Blood Urine Trace (*)     pH Urine 6.5      Protein Albumin Urine Negative      Urobilinogen Urine 8.0 (*)     Nitrite Urine Negative      Leukocyte Esterase Urine Negative      RBC Urine 1      WBC Urine 1      Squamous Epithelials Urine 3 (*)    CBC WITH PLATELETS AND DIFFERENTIAL - Abnormal    WBC Count 5.4      RBC Count 4.75      Hemoglobin 14.9      Hematocrit 44.9      MCV 95      MCH 31.4      MCHC 33.2      RDW 13.6      Platelet Count 126 (*)     % Neutrophils 73      % Lymphocytes 12      % Monocytes 15      % Eosinophils 0      % Basophils 0      % Immature Granulocytes 0      NRBCs per 100 WBC 0      Absolute Neutrophils 3.9      Absolute Lymphocytes 0.6 (*)     Absolute Monocytes 0.8      Absolute Eosinophils 0.0      Absolute Basophils 0.0      Absolute Immature Granulocytes 0.0      Absolute NRBCs 0.0     TROPONIN T, HIGH SENSITIVITY - Abnormal    Troponin T, High Sensitivity 28 (*)    TROPONIN T, HIGH SENSITIVITY - Abnormal    Troponin T, High Sensitivity 23 (*)    LIPASE - Normal    Lipase 17     LACTIC ACID WHOLE BLOOD - Normal    Lactic Acid 1.0        Emergency Department Course:  Reviewed:  I reviewed nursing notes, vitals, and past medical history    Assessments/Consultations/Discussion of Management or Tests :  I obtained history and examined the patient as noted above.   ED Course as of 12/08/23 0732   Thu Dec 07, 2023   1924 I called CT tech to discuss Cr.   2019 I spoke with Radiologist, who notes gallbladder is distended though no pericholecystic fluid.   2025 I updated patient and now son at bedside.  Pt declines  further pain medication.  Repeat abd exam is benign.   2035 I spoke with Dr. Gaspar, Hospitalist, who accepts care.       Independent Interpretation (X-rays, CTs, rhythm strip):  I personally reviewed his abdominal CT images, no major bowel obstruction is seen    Interventions:  Medications   senna-docusate (SENOKOT-S/PERICOLACE) 8.6-50 MG per tablet 1 tablet (has no administration in time range)     Or   senna-docusate (SENOKOT-S/PERICOLACE) 8.6-50 MG per tablet 2 tablet (has no administration in time range)   ondansetron (ZOFRAN ODT) ODT tab 4 mg (has no administration in time range)     Or   ondansetron (ZOFRAN) injection 4 mg (has no administration in time range)   sodium chloride 0.9 % infusion ( Intravenous $New Bag 12/7/23 2256)   acetaminophen (TYLENOL) tablet 650 mg (has no administration in time range)     Or   acetaminophen (TYLENOL) Suppository 650 mg (has no administration in time range)   oxyCODONE IR (ROXICODONE) half-tab 2.5 mg (has no administration in time range)   HYDROmorphone (DILAUDID) injection 0.2 mg (0.2 mg Intravenous $Given 12/7/23 2218)   zolpidem (AMBIEN) half-tab 2.5 mg (2.5 mg Oral $Given 12/7/23 2256)   zolpidem (AMBIEN) half-tab 2.5 mg (has no administration in time range)   pantoprazole (PROTONIX) IV push injection 40 mg (40 mg Intravenous $Given 12/7/23 2218)   alum & mag hydroxide-simethicone (MAALOX) suspension 15 mL (15 mLs Oral $Given 12/7/23 1951)   Saline Flush - CT (68 mLs Intravenous $Given 12/7/23 1945)   iopamidol (ISOVUE-370) solution 100 mL (100 mLs Intravenous $Given 12/7/23 1945)      Social Determinants of Health affecting care:   Healthcare Access/Compliance and Social Connections/Isolation    Disposition:  Admit to Dr. Gaspar    Impression & Plan    Medical Decision Making:  Based on his description of the discomfort, I was concerned about the possibility of biliary colic I also considered pancreatitis, choledocholithiasis, bowel obstruction and many others.  Note  "that he was here just within the past few days at which time his symptoms were suggestive of an adverse response to something he ate, possibly a viral process as well.  His abdominal exam was benign but I felt that given his age and recurrent symptoms, CT imaging was indicated.  This reveals evidence of a distended gallbladder, though he does not have a Rich sign on exam.  His bilirubin and liver enzymes have increased somewhat since his last ED visit.  He is furthermore found to have severe celiac artery stenosis, which may be playing some role, though does not appear to be acute based on imaging, nor does he have evidence of an elevated lactic acid level so I do not think that immediate vascular surgery consultation is indicated.  He also has a minimally elevated troponin, I considered whether his symptoms might represent a somewhat atypical presentation of angina, repeat troponin will be obtained but I do not think that heparin nor immediate cardiology consultation are indicated at this time.  Based on his multiple abnormal findings as well as patient age and comorbidities in combination with a strong desire to \"get to the bottom of it\" I do think that hospitalization for close monitoring and further care is indicated and he has been accepted to a monitored bed under the care of the hospitalist service after discussion of the above.  Patient and son notified of these findings and plan of care as well.    Diagnosis:    ICD-10-CM    1. Epigastric pain  R10.13       2. Abnormal CT of the abdomen  R93.5     distended gallbladder, and celiac artery stenosis      3. Elevated troponin  R79.89       4. Hyperbilirubinemia  E80.6       5. Elevated liver enzymes  R74.8          12/7/2023   MD Gelacio Meyer, Madi Arce MD  12/08/23 0735    "

## 2023-12-08 NOTE — PLAN OF CARE
Goal Outcome Evaluation:    Observation goals  PRIOR TO DISCHARGE        Comments:   -diagnostic tests and consults completed and resulted: Not met  -vital signs normal or at patient baseline: Not met, HTN  -adequate pain control on oral analgesics: Not met  -returns to baseline functional status:Not met  -safe disposition plan has been identified: Not met    Nurse to notify provider when observation goals have been met and patient is ready for discharge.

## 2023-12-08 NOTE — CONSULTS
[facility] General Surgery Consultation    José Miguel Hightower MRN# [id number]   YOB: 1935 Age: 87 year old      Date of Admission:  12/7/2023  Date of Consult: 12/8/2023         Assessment and Plan:   Patient is a 87 year old male with elevated LFTs and recent N/V/D followed by epigastric abdominal pain. Imaging suggestive of cirrhotic or fibrotic appearing liver, no biliary dilatation, and distended gallbladder without gallstones or evidence of acute cholecystitis. Abdominal pain has subsided.    PLAN:  - No leukocytosis and abdominal exam benign. Will order HIDA since acalculous cholecystitis is less common.  - GI consulted and appreciate their work up of elevated LFTs. No biliary dilatation seen.  - General Surgery will continue to follow for results of HIDA and GI recommendations.    **ADDENDUM**  - HIDA with cholestatic pattern concerning for common bile duct obstruction or severe hepatic dysfunction. Nurse paging GI team for possible ERCP.  - General Surgery will follow. Possible laparoscopic cholecystectomy this hospital stay vs outpatient.          Requesting Physician:      Dr. Gaspar        Chief Complaint:     Chief Complaint   Patient presents with    Abdominal Pain          History of Present Illness:   JoséM iguel Hightower is a 87 year old male who presented to the ED three days ago with a severe episode of nausea, vomiting, and diarrhea following mussel soup. He felt like he had a GI bug from this since he felt overall well once clearing his bowels. He did not have abdominal pain at that time, fever, chills, or any other symptoms. He ate a bland diet the next couple days and returned to the ED last night for new onset abdominal pain. He actually states the pain was worse on the left side and epigastric region. He's never had pain like this in the past. The pain was beneath both rib cages. He had not had food for 3 hours prior to the start of pain and the food he had a was a  "muffin and banana. He denies nausea, vomiting, and diarrhea with the pain. The pain has since completely gone away whereas yesterday it was constant and severe 7/10. Labs in the ED notable for elevated troponin at 28, normal white count, elevated LFTs (bilirubin 1.7, , , normal alk phos 127). Troponin improved to 23 and LFTs up this morning (bilirubin 1.7->2.6). Patient does not endorse alcohol use. 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 CT visualized lobular liver contour with atrophy of the left lobe suspicious for some degree of fibrosis or cirrhosis. No biliary dilation. Gallbladder distention with positive tensile fundus sign. Ultrasound also visualized distended gallbladder but no stones, wall thickening, pericholecystic fluid, and negative Rich's sign. No biliary dilatation seen.          Physical Exam:   Blood pressure (!) 150/67, pulse 55, temperature 97.9  F (36.6  C), temperature source Oral, resp. rate 18, height 1.702 m (5' 7\"), weight 91.8 kg (202 lb 6.4 oz), SpO2 96%.  202 lbs 6.4 oz  General: Vital signs reviewed, in no apparent distress  Eyes: Anicteric  HENT: Normocephalic, atraumatic, trachea midline   Respiratory: Breathing nonlabored  GI: Abdomen soft, nondistended, nontender to light and deep palpation  Musculoskeletal: No gross deformities  Neurologic: Grossly nonfocal exam  Psychiatric: Normal mood, affect and insight  Integumentary: Warm and dry         Past Medical History:     Past Medical History:   Diagnosis Date    Arthritis             Past Surgical History:     Past Surgical History:   Procedure Laterality Date    BACK SURGERY              Current Medications:          pantoprazole  40 mg Intravenous Daily with breakfast    zolpidem  2.5 mg Oral At Bedtime       acetaminophen **OR** acetaminophen, HYDROmorphone, ondansetron **OR** ondansetron, oxyCODONE IR, " senna-docusate **OR** senna-docusate, zolpidem         Home Medications:     Prior to Admission medications    Medication Sig Last Dose Taking? Auth Provider Long Term End Date   acetaminophen (QC ACETAMINOPHEN 8 HOURS) 650 MG CR tablet Take 650 mg by mouth 3 times daily 12/7/2023 Yes Unknown, Entered By History     zolpidem (AMBIEN) 10 MG tablet Take 3.33 mg by mouth Twice to three times throughout the night. Patient takes a third of a zolpidem (3.33mg at 2300 and then 0230 and/or 0430)    Patient is ok with taking 2.5mg while hospitalized and then having an additional dose on hand if needed since 3.33mg not an available pre-packed strength) 12/6/2023 at PM Yes Unknown, Entered By History     ondansetron (ZOFRAN ODT) 4 MG ODT tab Take 1 tablet (4 mg) by mouth every 8 hours as needed for nausea or vomiting   Christine Hernandez DO              Allergies:   No Known Allergies         Family History:     Family History   Problem Relation Age of Onset    Cerebrovascular Disease Father     C.A.D. Brother            Social History:   José Miguel Hightower  reports that he has never smoked. He does not have any smokeless tobacco history on file. He reports current alcohol use. He reports that he does not use drugs.          Review of Systems:   The 12 point Review of Systems is negative other than noted in the HPI.         Labs/Imaging   All new lab and imaging data was reviewed.   Recent Labs   Lab 12/08/23  0644 12/07/23  1847 12/06/23  0046   WBC 4.6 5.4 6.3   HGB 13.8 14.9 16.4   HCT 40.6 44.9 48.7   MCV 93 95 93   * 126* 128*     Recent Labs   Lab 12/08/23  0644 12/07/23  1847 12/06/23  0046    140 141   POTASSIUM 4.2 4.4 4.3   CHLORIDE 106 103 105   CO2 26 27 22   ANIONGAP 8 10 14   GLC 89 135* 179*   BUN 14.6 21.8 21.1   CR 0.85 1.13 1.05   GFRESTIMATED 85 64 70   EMILE 8.3* 8.6* 8.8   MAG  --   --  1.9   PROTTOTAL 5.8* 6.7 7.0   ALBUMIN 3.4* 3.9 4.0   BILITOTAL 2.6* 1.7* 1.1   ALKPHOS 131 127 125   AST  143* 152* 57*   * 103* 67       I have personally reviewed the imaging studies-     US ABDOMEN  Narrative & Impression   EXAM: US ABDOMEN LIMITED  LOCATION: [facility]  DATE: 12/8/2023     INDICATION: abd pain, elvated LFT, abnormal CT  COMPARISON: None.  TECHNIQUE: Limited abdominal ultrasound.     FINDINGS:     GALLBLADDER: Gallbladder is distended. No gallstones, wall thickening, or pericholecystic fluid. Negative sonographic Rich's sign.     BILE DUCTS: No biliary dilatation. The common duct measures 6 mm.     LIVER: Left lobe of the liver not well seen secondary to positioning and bowel gas. Normal parenchyma with smooth contour. No focal mass.     RIGHT KIDNEY: No hydronephrosis.     PANCREAS: Not well seen secondary to bowel gas. The visualized portions are normal.     No ascites.                                                                      IMPRESSION:  1.  Gallbladder is distended. No gallstones, wall thickening, or pericholecystic fluid. Negative sonographic Rich's sign.       2.  Left lobe of the liver not well seen secondary to positioning and bowel gas. Normal parenchyma with smooth contour. No focal mass.     3.  Right upper quadrant ultrasound otherwise unremarkable.        CT ABDOMEN PELVIS  Narrative & Impression   EXAM: CT ABDOMEN PELVIS W CONTRAST  LOCATION: [facility]  DATE: 12/7/2023     INDICATION: acute epigastric pain, no prior abd surgery  COMPARISON: None.  TECHNIQUE: CT scan of the abdomen and pelvis was performed following injection of IV contrast. Multiplanar reformats were obtained. Dose reduction techniques were used.  CONTRAST: 100 mL Isovue 370     FINDINGS:      LOWER CHEST: Bibasilar atelectasis. Coronary calcifications.     HEPATOBILIARY: Lobular liver contour with atrophy of the left lobe suspicious for some degree of fibrosis or cirrhosis. No biliary dilation. Gallbladder distention with positive tensile  fundus sign (series 3 image 73).     PANCREAS: Normal.     SPLEEN: Normal.     ADRENAL GLANDS: Normal.     KIDNEYS/BLADDER: Subcentimeter hypoattenuating lesions are too small to characterize. No hydronephrosis. Normal urinary bladder.      BOWEL: No obstruction. Normal appendix. Colonic diverticulosis. No pneumoperitoneum or free fluid.      LYMPH NODES: No pathologically enlarged lymph nodes.     VASCULATURE: Nonaneurysmal aorta with severe aortoiliac calcifications. 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 Likely at least mild-to-moderate stenosis of the bilateral renal arteries near the   origins. Patent portal, splenic, and superior mesenteric veins.      PELVIC ORGANS: Prostatomegaly.     MUSCULOSKELETAL: Multilevel degenerative disc disease of the thoracolumbar spine. Small, fat-containing left inguinal hernia.                                                                      IMPRESSION:  1.  No acute abnormalities in the abdomen or pelvis.  2.  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  3.  Prostatomegaly.  4.  Chronic and ancillary findings as described         Kiana Roman PA-C    60 minutes spent on date of the encounter doing patient visit, chart review, and documentation.

## 2023-12-08 NOTE — ED NOTES
Bed: ED22  Expected date:   Expected time:   Means of arrival:   Comments:  Silva 1 87M abdominal pain

## 2023-12-08 NOTE — H&P
"[facility]    History and Physical - Hospitalist Service       Date of Admission:  12/7/2023    Assessment & Plan      José Miguel Hightower is a 87 year old male with hyperlipidemia, insomnia, was brought to the ER due to abdominal pain and registered under observation on 12/7/2023     Abdominal pain  Suspected acute cholecystitis  Was in ER night before with nausea vomiting.  Above improved but now upper abdominal pain without fever.  No diarrhea, hematochezia or melena.  WBC normal.  CT abdomen pelvis showed lobular liver contour with atrophy of left lobe, fibrosis or cirrhosis, distended GB with positive tensile fundus sign. 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   -Registered under observation  -Clear liquid diet for now, n.p.o. after midnight, IV fluid, pain med including low dose tylenol, minimize given mildly elevated LFT  -RUQ US/ gen surgery consult   -Follow LFTs  -hold off on abx      Celiac artery stenosis  -denies post prandial abd pain. Check lactic acid.   - vascular surgery consult.     Abnormal troponin  No chest pain. Troponin mildly elevated, EKG sinus and no ischemic changes.   - repeat troponin  - TTE    Hyperlipidemia  -Not on statin. Now LFTs are up.     Insomnia  PTA PRN ambien ordered.     TABITHA  -Stopped using CPAP          Diet:  NPO  DVT Prophylaxis: Pneumatic Compression Devices  Scott Catheter: Not present  Lines: None     Cardiac Monitoring: None  Code Status:  Full code     Clinically Significant Risk Factors Present on Admission                      # Obesity: Estimated body mass index is 31.01 kg/m  as calculated from the following:    Height as of this encounter: 1.702 m (5' 7\").    Weight as of this encounter: 89.8 kg (198 lb).              Disposition Plan            Karen Gaspar MD  Hospitalist Service  [facility]  Securely message with [employer] (more info)  Text " page via [facility] Paging/Directory     ______________________________________________________________________    Chief Complaint   Abdominal pain  Nausea, vomiting and diarrhea.    History is obtained from the patient, family and chart review     History of Present Illness   José Miguel Hightower is a 87 year old male with TABITHA, hyperlipidemia, insomnia was brought to the ER for evaluation of upper abdominal pain.    Was in ER night before with nausea vomiting. With vomiting, he states he was having loose BM, but says it was not diarrhea. These symptoms improved but developed upper abdominal pain  around 4 pm today, last meal was at noon, had english muffin, and last BM this AM, and was loose. No hematochezia or melena. No fever.  Pain is 7/10, constant. Non radiating.     Denies chest pain or dyspnea. No dizziness.     In ER, patient was evaluated by . Patient was hypertensive. WBC normal.  LFTs mildly elevated. Lipase negative. CT abdomen pelvis showed lobular liver contour with atrophy of left lobe, fibrosis or cirrhosis, distended GB with positive tensile fundus sign.  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 GI cocktail given and hospital contacted for admission.     Past Medical History    Past Medical History:   Diagnosis Date    Arthritis        Past Surgical History   Past Surgical History:   Procedure Laterality Date    BACK SURGERY         Prior to Admission Medications   Prior to Admission Medications   Prescriptions Last Dose Informant Patient Reported? Taking?   ASPIRIN PO   Yes No   Sig: Take 81 mg by mouth.   GLUCOSAMINE SULFATE PO   Yes No   Sig: Take  by mouth.   ZOLPIDEM TARTRATE PO   Yes No   Sig: Take 5 mg by mouth.   multivitamin, therapeutic with minerals (MULTI-VITAMIN) TABS   Yes No   Sig: Take 1 tablet by mouth daily.   ondansetron (ZOFRAN ODT) 4 MG ODT tab   No No   Sig: Take 1 tablet (4 mg) by mouth every 8 hours as  needed for nausea or vomiting      Facility-Administered Medications: None        Review of Systems    The 10 point Review of Systems is negative other than noted in the HPI or here.       Physical Exam   Vital Signs: Temp: 98.1  F (36.7  C) Temp src: Temporal BP: (!) 179/95 Pulse: 62   Resp: 16 SpO2: 98 % O2 Device: None (Room air)    Weight: 198 lbs 0 oz    General: AAOx3, appears comfortable.  HEENT: PERRLA EOMI. Mucosa moist.   Lungs: Bilateral equal air entry. Clear to auscultation, normal work of breathing.   CVS: S1S2 regular, no tachycardia or murmur.   Abdomen: Soft, mild epigastric/RUQ tenderness present, no guarding or rigidity, ND. BS heard.  MSK: No edema or deformities.  Neuro: AAOX3. CN 2-12 normal. Strength symmetrical.  Skin: No rash.       Medical Decision Making       60 MINUTES SPENT BY ME on the date of service doing chart review, history, exam, documentation & further activities per the note.      Data     I have personally reviewed the following data over the past 24 hrs:    5.4  \   14.9   / 126 (L)     140 103 21.8 /  135 (H)   4.4 27 1.13 \     ALT: 103 (H) AST: 152 (H) AP: 127 TBILI: 1.7 (H)   ALB: 3.9 TOT PROTEIN: 6.7 LIPASE: 17     Trop: 28 (H) BNP: N/A       Imaging results reviewed over the past 24 hrs:   Recent Results (from the past 24 hour(s))   CT Abdomen Pelvis w Contrast    Narrative    EXAM: CT ABDOMEN PELVIS W CONTRAST  LOCATION: [facility]  DATE: 12/7/2023    INDICATION: acute epigastric pain, no prior abd surgery  COMPARISON: None.  TECHNIQUE: CT scan of the abdomen and pelvis was performed following injection of IV contrast. Multiplanar reformats were obtained. Dose reduction techniques were used.  CONTRAST: 100 mL Isovue 370    FINDINGS:     LOWER CHEST: Bibasilar atelectasis. Coronary calcifications.     HEPATOBILIARY: Lobular liver contour with atrophy of the left lobe suspicious for some degree of fibrosis or cirrhosis. No biliary dilation.  Gallbladder distention with positive tensile fundus sign (series 3 image 73).     PANCREAS: Normal.     SPLEEN: Normal.     ADRENAL GLANDS: Normal.     KIDNEYS/BLADDER: Subcentimeter hypoattenuating lesions are too small to characterize. No hydronephrosis. Normal urinary bladder.      BOWEL: No obstruction. Normal appendix. Colonic diverticulosis. No pneumoperitoneum or free fluid.     LYMPH NODES: No pathologically enlarged lymph nodes.    VASCULATURE: Nonaneurysmal aorta with severe aortoiliac calcifications. 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 Likely at least mild-to-moderate stenosis of the bilateral renal arteries near the   origins. Patent portal, splenic, and superior mesenteric veins.     PELVIC ORGANS: Prostatomegaly.     MUSCULOSKELETAL: Multilevel degenerative disc disease of the thoracolumbar spine. Small, fat-containing left inguinal hernia.       Impression    IMPRESSION:  1.  No acute abnormalities in the abdomen or pelvis.  2.  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  3.  Prostatomegaly.  4.  Chronic and ancillary findings as described

## 2023-12-08 NOTE — PLAN OF CARE
PRIMARY Concern: abdominal pain   SAFETY RISK Concerns (fall risk, behaviors, etc.): Fall      Isolation/Type: None  Tests/Procedures for NEXT shift: Echo, AM Labs  Consults? (Pending/following, signed-off?) Gen surg, Vascular surg  Where is patient from? (Home, TCU, etc.): Home  Other Important info for NEXT shift: Abd US completed   Anticipated DC date & active delays: TBD  _____________________________________________________________________________  SUMMARY NOTE:    Orientation/Cognitive: A/O x4  Observation Goals (Met/ Not Met): Not met  Mobility Level/Assist Equipment: Ax1/GB/W  Antibiotics & Plan (IV/po, length of tx left): None  Pain Management: Prn IV Dilaudid   Tele/VS/O2: VSS on RA, ex HTN  ABNL Lab/BG: Elevated LFTs, Trops flat   Diet: NPO after MN  Bowel/Bladder: Cont  Skin Concerns: None  Drains/Devices: NS @  100/hr  Patient Stated Goal for Today: Rest     Observation goals  PRIOR TO DISCHARGE        Comments: -diagnostic tests and consults completed and resulted  -vital signs normal or at patient baseline  -adequate pain control on oral analgesics  -returns to baseline functional status  -safe disposition plan has been identified  Nurse to notify provider when observation goals have been met and patient is ready for discharge.

## 2023-12-08 NOTE — PROVIDER NOTIFICATION
MD Notification    Notified Person: MD    Notified Person Name: Kiana Roman,     Notification Date/Time: 12/08/23 3:18 PM     Notification Interaction: vocera message     Purpose of Notification: FYI- the hepatobiliary scan resulted. I thought I saw an ERCP order come through and now I can't find it. Will he be getting one eventually?     Orders Received:    Comments:

## 2023-12-08 NOTE — CONSULTS
VASCULAR SURGERY    We are asked to evaluate José Miguel Hightower this morning.  This 87-year-old independent gentleman developed acute onset of upper abdominal pain supraumbilical radiating to both flanks yesterday.  Had been seen earlier in the week for nausea and diarrhea felt due to the flu.  Patient reports no postprandial abdominal discomfort historically.      Patient is medically examined by Trudy Cruz vascular NP.  Benign exam as noted.(See her consult note).    I reviewed the CT scan.  He has mild diffuse aortic calcification with no stenosis.  Severe calcified stenosis of the origin of the celiac artery but patent distally.  Mild stenosis with good flow in the SMA.  No evidence of bowel ischemia.      IMPRESSION: Episode of abdominal discomfort now resolved.  Patient does have celiac artery calcified stenosis but very adequate flow via the SMA and collaterals feeding into the celiac artery.  I do not feel that he has any signs of mesenteric ischemia related to the celiac calcified stenosis.    I would not recommend intervention (i.e. interventional radiology --angioplasty and stenting) unless patient has ongoing symptoms.    Juan Pablo Perales MD

## 2023-12-08 NOTE — ED NOTES
"[facility]  ED Nurse Handoff Report    ED Chief complaint: Abdominal Pain      ED Diagnosis:   Final diagnoses:   Epigastric pain   Abnormal CT of the abdomen - distended gallbladder, and celiac artery stenosis   Elevated troponin   Hyperbilirubinemia   Elevated liver enzymes       Code Status: MD need to assess    Allergies: No Known Allergies    Patient Story:  Arrived via after standing up from a desk this afternoon and having sudden onset upper abdominal pain above the belly button and across both sides. 7/10 pressure pain no nausea, was seen last Tuesday for nausea and diarrhea due to flu and ws sent home. Patient is alert and oriented x4. Yao and cooperative. Able to stand on his own.      Focused Assessment:  Monitor VS and Pain. Continue serial troponin    Treatments and/or interventions provided: Blood work up and Imaging. GI cocktail was also given.   Patient's response to treatments and/or interventions: patient denies pain at this moment. Resting and  watching TV.     To be done/followed up on inpatient unit:  Continue plan of care, monitor pain and VS. Serial troponin monitoring.     Does this patient have any cognitive concerns?:  Na    Activity level - Baseline/Home:  Independent  Activity Level - Current:   Stand with Assist    Patient's Preferred language: English   Needed?: No    Isolation: None  Infection: Not Applicable  Patient tested for COVID 19 prior to admission: NO  Bariatric?: No    Vital Signs:   Vitals:    12/07/23 1836 12/07/23 1838   BP:  (!) 179/95   Pulse:  62   Resp:  16   Temp: 98.1  F (36.7  C) 98.1  F (36.7  C)   TempSrc: Temporal Temporal   SpO2:  98%   Weight:  89.8 kg (198 lb)   Height:  1.702 m (5' 7\")     Labs Ordered and Resulted from Time of ED Arrival to Time of ED Departure   COMPREHENSIVE METABOLIC PANEL - Abnormal       Result Value    Sodium 140      Potassium 4.4      Carbon Dioxide (CO2) 27      Anion Gap 10      Urea Nitrogen 21.8      " Creatinine 1.13      GFR Estimate 64      Calcium 8.6 (*)     Chloride 103      Glucose 135 (*)     Alkaline Phosphatase 127       (*)      (*)     Protein Total 6.7      Albumin 3.9      Bilirubin Total 1.7 (*)    ROUTINE UA WITH MICROSCOPIC - Abnormal    Color Urine Yellow      Appearance Urine Clear      Glucose Urine Negative      Bilirubin Urine Negative      Ketones Urine Negative      Specific Gravity Urine 1.010      Blood Urine Trace (*)     pH Urine 6.5      Protein Albumin Urine Negative      Urobilinogen Urine 8.0 (*)     Nitrite Urine Negative      Leukocyte Esterase Urine Negative      RBC Urine 1      WBC Urine 1      Squamous Epithelials Urine 3 (*)    CBC WITH PLATELETS AND DIFFERENTIAL - Abnormal    WBC Count 5.4      RBC Count 4.75      Hemoglobin 14.9      Hematocrit 44.9      MCV 95      MCH 31.4      MCHC 33.2      RDW 13.6      Platelet Count 126 (*)     % Neutrophils 73      % Lymphocytes 12      % Monocytes 15      % Eosinophils 0      % Basophils 0      % Immature Granulocytes 0      NRBCs per 100 WBC 0      Absolute Neutrophils 3.9      Absolute Lymphocytes 0.6 (*)     Absolute Monocytes 0.8      Absolute Eosinophils 0.0      Absolute Basophils 0.0      Absolute Immature Granulocytes 0.0      Absolute NRBCs 0.0     TROPONIN T, HIGH SENSITIVITY - Abnormal    Troponin T, High Sensitivity 28 (*)    LIPASE - Normal    Lipase 17     TROPONIN T, HIGH SENSITIVITY      CT Abdomen Pelvis w Contrast   Final Result   IMPRESSION:   1.  No acute abnormalities in the abdomen or pelvis.   2.  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   3.  Prostatomegaly.   4.  Chronic and ancillary findings as described           Cardiac Rhythm:     Was the PSS-3 completed:   Yes  What interventions are required if any?               Family Comments: Son at bedside  OBS brochure/video discussed/provided to patient/family: N/A              Name of person  given brochure if not patient:               Relationship to patient:     For the majority of the shift this patient's behavior was Green.   Behavioral interventions performed were Frequent rounding.    ED NURSE PHONE NUMBER: *[phone]

## 2023-12-08 NOTE — PROGRESS NOTES
Observation goals  PRIOR TO DISCHARGE        Comments:   -diagnostic tests and consults completed and resulted- not met   -vital signs normal or at patient baseline- met  -adequate pain control on oral analgesics- met   -returns to baseline functional status- partially met  -safe disposition plan has been identified- not met   Nurse to notify provider when observation goals have been met and patient is ready for discharge

## 2023-12-08 NOTE — PROGRESS NOTES
RECEIVING UNIT ED HANDOFF REVIEW    ED Nurse Handoff Report was reviewed by: Danette Hernandez RN on December 7, 2023 at 10:10 PM

## 2023-12-08 NOTE — PHARMACY-ADMISSION MEDICATION HISTORY
Pharmacist Admission Medication History    Admission medication history is complete. The information provided in this note is only as accurate as the sources available at the time of the update.    Information Source(s): Patient and CareEverywhere/SureScripts via in-person    Pertinent Information:   1) Patient takes a third of a zolpidem 10mg tablet twice to three times throughout the night. (2300 and then 0230 and/or 0430). Patient is ok with taking 2.5mg while hospitalized and then having an additional dose on hand if needed since 3.33mg not an available pre-packed strength.    Changes made to PTA medication list:  Added: acetaminophen  Deleted: aspirin, glucosamine, MV  Changed: zolpidem dose per above      Allergies reviewed with patient and updates made in EHR: yes    Medication History Completed By: Chari Giraldo, PharmD 12/7/2023 8:59 PM    PTA Med List   Medication Sig Last Dose    acetaminophen (QC ACETAMINOPHEN 8 HOURS) 650 MG CR tablet Take 650 mg by mouth 3 times daily 12/7/2023    zolpidem (AMBIEN) 10 MG tablet Take 3.33 mg by mouth Twice to three times throughout the night. Patient takes a third of a zolpidem (3.33mg at 2300 and then 0230 and/or 0430)    Patient is ok with taking 2.5mg while hospitalized and then having an additional dose on hand if needed since 3.33mg not an available pre-packed strength) 12/6/2023 at PM

## 2023-12-08 NOTE — PROGRESS NOTES
Admission/Transfer from: ER Admission   2 RN skin assessment completed. No  Significant findings include: None  WOC Nurse Consult Ordered? No

## 2023-12-09 ENCOUNTER — APPOINTMENT (OUTPATIENT)
Dept: GENERAL RADIOLOGY | Facility: CLINIC | Age: 88
DRG: 446 | End: 2023-12-09
Attending: HOSPITALIST
Payer: COMMERCIAL

## 2023-12-09 LAB
ERCP: NORMAL
INR PPP: 1.11 (ref 0.85–1.15)

## 2023-12-09 PROCEDURE — 710N000009 HC RECOVERY PHASE 1, LEVEL 1, PER MIN: Performed by: INTERNAL MEDICINE

## 2023-12-09 PROCEDURE — 360N000083 HC SURGERY LEVEL 3 W/ FLUORO, PER MIN: Performed by: INTERNAL MEDICINE

## 2023-12-09 PROCEDURE — 250N000011 HC RX IP 250 OP 636: Mod: JZ | Performed by: ANESTHESIOLOGY

## 2023-12-09 PROCEDURE — 0F798DZ DILATION OF COMMON BILE DUCT WITH INTRALUMINAL DEVICE, VIA NATURAL OR ARTIFICIAL OPENING ENDOSCOPIC: ICD-10-PCS | Performed by: INTERNAL MEDICINE

## 2023-12-09 PROCEDURE — 36415 COLL VENOUS BLD VENIPUNCTURE: CPT | Performed by: INTERNAL MEDICINE

## 2023-12-09 PROCEDURE — 999N000141 HC STATISTIC PRE-PROCEDURE NURSING ASSESSMENT: Performed by: INTERNAL MEDICINE

## 2023-12-09 PROCEDURE — 99231 SBSQ HOSP IP/OBS SF/LOW 25: CPT | Performed by: SURGERY

## 2023-12-09 PROCEDURE — 85610 PROTHROMBIN TIME: CPT | Performed by: INTERNAL MEDICINE

## 2023-12-09 PROCEDURE — 258N000003 HC RX IP 258 OP 636: Performed by: HOSPITALIST

## 2023-12-09 PROCEDURE — C9113 INJ PANTOPRAZOLE SODIUM, VIA: HCPCS | Performed by: HOSPITALIST

## 2023-12-09 PROCEDURE — C2617 STENT, NON-COR, TEM W/O DEL: HCPCS | Performed by: INTERNAL MEDICINE

## 2023-12-09 PROCEDURE — 272N000001 HC OR GENERAL SUPPLY STERILE: Performed by: INTERNAL MEDICINE

## 2023-12-09 PROCEDURE — 99233 SBSQ HOSP IP/OBS HIGH 50: CPT | Performed by: INTERNAL MEDICINE

## 2023-12-09 PROCEDURE — 258N000003 HC RX IP 258 OP 636: Performed by: ANESTHESIOLOGY

## 2023-12-09 PROCEDURE — 120N000001 HC R&B MED SURG/OB

## 2023-12-09 PROCEDURE — 250N000009 HC RX 250: Performed by: NURSE ANESTHETIST, CERTIFIED REGISTERED

## 2023-12-09 PROCEDURE — 370N000017 HC ANESTHESIA TECHNICAL FEE, PER MIN: Performed by: INTERNAL MEDICINE

## 2023-12-09 PROCEDURE — 250N000011 HC RX IP 250 OP 636: Performed by: NURSE ANESTHETIST, CERTIFIED REGISTERED

## 2023-12-09 PROCEDURE — 250N000013 HC RX MED GY IP 250 OP 250 PS 637: Performed by: HOSPITALIST

## 2023-12-09 PROCEDURE — 0FC98ZZ EXTIRPATION OF MATTER FROM COMMON BILE DUCT, VIA NATURAL OR ARTIFICIAL OPENING ENDOSCOPIC: ICD-10-PCS | Performed by: INTERNAL MEDICINE

## 2023-12-09 PROCEDURE — C1769 GUIDE WIRE: HCPCS | Performed by: INTERNAL MEDICINE

## 2023-12-09 PROCEDURE — 250N000011 HC RX IP 250 OP 636: Performed by: HOSPITALIST

## 2023-12-09 PROCEDURE — 999N000179 XR SURGERY CARM FLUORO LESS THAN 5 MIN W STILLS: Mod: TC

## 2023-12-09 DEVICE — STENT COTTON LEUNG (AMSTERDAM) BIL 10FRX05CM CLSO-10-5: Type: IMPLANTABLE DEVICE | Status: FUNCTIONAL

## 2023-12-09 RX ORDER — ONDANSETRON 2 MG/ML
INJECTION INTRAMUSCULAR; INTRAVENOUS PRN
Status: DISCONTINUED | OUTPATIENT
Start: 2023-12-09 | End: 2023-12-09

## 2023-12-09 RX ORDER — HYDRALAZINE HYDROCHLORIDE 20 MG/ML
5 INJECTION INTRAMUSCULAR; INTRAVENOUS ONCE
Status: COMPLETED | OUTPATIENT
Start: 2023-12-09 | End: 2023-12-09

## 2023-12-09 RX ORDER — PROPOFOL 10 MG/ML
INJECTION, EMULSION INTRAVENOUS CONTINUOUS PRN
Status: DISCONTINUED | OUTPATIENT
Start: 2023-12-09 | End: 2023-12-09

## 2023-12-09 RX ORDER — DEXAMETHASONE SODIUM PHOSPHATE 4 MG/ML
INJECTION, SOLUTION INTRA-ARTICULAR; INTRALESIONAL; INTRAMUSCULAR; INTRAVENOUS; SOFT TISSUE PRN
Status: DISCONTINUED | OUTPATIENT
Start: 2023-12-09 | End: 2023-12-09

## 2023-12-09 RX ORDER — ONDANSETRON 2 MG/ML
4 INJECTION INTRAMUSCULAR; INTRAVENOUS EVERY 6 HOURS PRN
Status: DISCONTINUED | OUTPATIENT
Start: 2023-12-09 | End: 2023-12-10 | Stop reason: HOSPADM

## 2023-12-09 RX ORDER — LIDOCAINE HYDROCHLORIDE 20 MG/ML
INJECTION, SOLUTION INFILTRATION; PERINEURAL PRN
Status: DISCONTINUED | OUTPATIENT
Start: 2023-12-09 | End: 2023-12-09

## 2023-12-09 RX ORDER — FENTANYL CITRATE 50 UG/ML
INJECTION, SOLUTION INTRAMUSCULAR; INTRAVENOUS PRN
Status: DISCONTINUED | OUTPATIENT
Start: 2023-12-09 | End: 2023-12-09

## 2023-12-09 RX ORDER — LIDOCAINE 40 MG/G
CREAM TOPICAL
Status: DISCONTINUED | OUTPATIENT
Start: 2023-12-09 | End: 2023-12-09 | Stop reason: HOSPADM

## 2023-12-09 RX ORDER — INDOMETHACIN 50 MG/1
100 SUPPOSITORY RECTAL
Status: DISCONTINUED | OUTPATIENT
Start: 2023-12-09 | End: 2023-12-09 | Stop reason: HOSPADM

## 2023-12-09 RX ORDER — SODIUM CHLORIDE, SODIUM LACTATE, POTASSIUM CHLORIDE, CALCIUM CHLORIDE 600; 310; 30; 20 MG/100ML; MG/100ML; MG/100ML; MG/100ML
INJECTION, SOLUTION INTRAVENOUS CONTINUOUS
Status: DISCONTINUED | OUTPATIENT
Start: 2023-12-09 | End: 2023-12-09 | Stop reason: HOSPADM

## 2023-12-09 RX ORDER — PROPOFOL 10 MG/ML
INJECTION, EMULSION INTRAVENOUS PRN
Status: DISCONTINUED | OUTPATIENT
Start: 2023-12-09 | End: 2023-12-09

## 2023-12-09 RX ORDER — FLUMAZENIL 0.1 MG/ML
0.2 INJECTION, SOLUTION INTRAVENOUS
Status: ACTIVE | OUTPATIENT
Start: 2023-12-09 | End: 2023-12-09

## 2023-12-09 RX ORDER — ONDANSETRON 4 MG/1
4 TABLET, ORALLY DISINTEGRATING ORAL EVERY 6 HOURS PRN
Status: DISCONTINUED | OUTPATIENT
Start: 2023-12-09 | End: 2023-12-10 | Stop reason: HOSPADM

## 2023-12-09 RX ADMIN — SODIUM CHLORIDE: 9 INJECTION, SOLUTION INTRAVENOUS at 14:30

## 2023-12-09 RX ADMIN — SODIUM CHLORIDE: 9 INJECTION, SOLUTION INTRAVENOUS at 00:02

## 2023-12-09 RX ADMIN — FENTANYL CITRATE 50 MCG: 50 INJECTION INTRAMUSCULAR; INTRAVENOUS at 11:31

## 2023-12-09 RX ADMIN — ROCURONIUM BROMIDE 50 MG: 50 INJECTION, SOLUTION INTRAVENOUS at 11:38

## 2023-12-09 RX ADMIN — ZOLPIDEM TARTRATE 2.5 MG: 5 TABLET, FILM COATED ORAL at 00:58

## 2023-12-09 RX ADMIN — PANTOPRAZOLE SODIUM 40 MG: 40 INJECTION, POWDER, FOR SOLUTION INTRAVENOUS at 07:39

## 2023-12-09 RX ADMIN — LIDOCAINE HYDROCHLORIDE 60 MG: 20 INJECTION, SOLUTION INFILTRATION; PERINEURAL at 11:37

## 2023-12-09 RX ADMIN — SODIUM CHLORIDE: 9 INJECTION, SOLUTION INTRAVENOUS at 10:17

## 2023-12-09 RX ADMIN — HYDRALAZINE HYDROCHLORIDE 5 MG: 20 INJECTION INTRAMUSCULAR; INTRAVENOUS at 13:39

## 2023-12-09 RX ADMIN — SODIUM CHLORIDE, POTASSIUM CHLORIDE, SODIUM LACTATE AND CALCIUM CHLORIDE: 600; 310; 30; 20 INJECTION, SOLUTION INTRAVENOUS at 11:31

## 2023-12-09 RX ADMIN — PROPOFOL 120 MG: 10 INJECTION, EMULSION INTRAVENOUS at 11:37

## 2023-12-09 RX ADMIN — ZOLPIDEM TARTRATE 2.5 MG: 5 TABLET, FILM COATED ORAL at 22:40

## 2023-12-09 RX ADMIN — FENTANYL CITRATE 50 MCG: 50 INJECTION INTRAMUSCULAR; INTRAVENOUS at 11:37

## 2023-12-09 RX ADMIN — ONDANSETRON 4 MG: 2 INJECTION INTRAMUSCULAR; INTRAVENOUS at 11:48

## 2023-12-09 RX ADMIN — SUGAMMADEX 200 MG: 100 INJECTION, SOLUTION INTRAVENOUS at 12:10

## 2023-12-09 RX ADMIN — DEXAMETHASONE SODIUM PHOSPHATE 4 MG: 4 INJECTION, SOLUTION INTRA-ARTICULAR; INTRALESIONAL; INTRAMUSCULAR; INTRAVENOUS; SOFT TISSUE at 11:48

## 2023-12-09 RX ADMIN — PROPOFOL 120 MCG/KG/MIN: 10 INJECTION, EMULSION INTRAVENOUS at 11:41

## 2023-12-09 ASSESSMENT — ACTIVITIES OF DAILY LIVING (ADL)
ADLS_ACUITY_SCORE: 35
ADLS_ACUITY_SCORE: 33
ADLS_ACUITY_SCORE: 33
ADLS_ACUITY_SCORE: 35
ADLS_ACUITY_SCORE: 33
ADLS_ACUITY_SCORE: 33
ADLS_ACUITY_SCORE: 35
ADLS_ACUITY_SCORE: 33
ADLS_ACUITY_SCORE: 35
ADLS_ACUITY_SCORE: 35

## 2023-12-09 ASSESSMENT — ENCOUNTER SYMPTOMS: SEIZURES: 0

## 2023-12-09 ASSESSMENT — LIFESTYLE VARIABLES: TOBACCO_USE: 0

## 2023-12-09 NOTE — PLAN OF CARE
PRIMARY Concern: Admitted for Abdominal pain, elevated enzymes/ bilirubin and possible choledocholithiasis with biliary colic   SAFETY RISK Concerns (fall risk, behaviors, etc.): Fall      Isolation/Type: None  Tests/Procedures for NEXT shift: AM Labs,  Consults? (Pending/following, signed-off?), GI following  Where is patient from? (Home, TCU, etc.): Home  Other Important info for NEXT shift:   Anticipated DC date & active delays: discharge should happen tomorrow     SUMMARY NOTE:     Orientation/Cognitive: A/O x4  Observation Goals (Met/ Not Met): inpt  Mobility Level/Assist Equipment: Ax1/GB/W  Antibiotics & Plan (IV/po, length of tx left): None  Pain Management: PRNs available   Tele/VS/O2: VSS on RA, ex HTN  ABNL Lab/BG: Elevated LFTs  Diet: Reg  Bowel/Bladder: Cont. No BM this shift   Skin Concerns: None  Drains/Devices: NS @ 100/hr  Patient Stated Goal for Today: Rest

## 2023-12-09 NOTE — PLAN OF CARE
PRIMARY Concern: Admitted for Abdominal pain, elevated enzymes/ bilirubin and possible choledocholithiasis with biliary colic   SAFETY RISK Concerns (fall risk, behaviors, etc.): Fall      Isolation/Type: None  Tests/Procedures for NEXT shift: AM Labs, notify provider if  ERCP elevated   Consults? (Pending/following, signed-off?), GI consult  Where is patient from? (Home, TCU, etc.): Home  Other Important info for NEXT shift: Possible ERCP Today or lap yandy @ 11am  Anticipated DC date & active delays: Possible discharge today depending on liver enzyme trend  SUMMARY NOTE:     Orientation/Cognitive: A/O x4  Observation Goals (Met/ Not Met): inpt  Mobility Level/Assist Equipment: Ax1/GB/W  Antibiotics & Plan (IV/po, length of tx left): None  Pain Management: PRNs available   Tele/VS/O2: VSS on RA, ex HTN  ABNL Lab/BG: Elevated LFTs, Trops elevated  Diet: Reg, NPO @ midnight   Bowel/Bladder: Cont  Skin Concerns: None  Drains/Devices: NS @ 100/hr  Patient Stated Goal for Today: Rest

## 2023-12-09 NOTE — CONSULTS
General surgery note    Patient presented with cholestasis and hyperbilirubinemia.  Had ERCP and stent placement in which sludge was found within the common bile duct.  Contrast from the ERCP went into the gallbladder showing no obstruction of the gallbladder.  Ultrasound of the gallbladder was benign.  I would not offer this gentleman in operation to remove his gallbladder at this time.  If he is able to tolerate diet and remains symptoms free he could be discharged.      He is very pleased with this plan.    Total encounter time 35 minutes, more than half spent in counseling, review of data, and coordination of care.

## 2023-12-09 NOTE — ANESTHESIA POSTPROCEDURE EVALUATION
Patient: José Miguel Hightower    Procedure: Procedure(s):  ENDOSCOPIC ULTRASOUND, ESOPHAGOSCOPY / UPPER GASTROINTESTINAL TRACT (GI)  ENDOSCOPIC RETROGRADE CHOLANGIOPANCREATOGRAPHY, sphincterotomy, stent placement       Anesthesia Type:  General    Note:  Disposition: Inpatient   Postop Pain Control: Uneventful            Sign Out: Well controlled pain   PONV: No   Neuro/Psych: Uneventful            Sign Out: Acceptable/Baseline neuro status   Airway/Respiratory: Uneventful            Sign Out: Acceptable/Baseline resp. status   CV/Hemodynamics: Uneventful            Sign Out: Acceptable CV status; No obvious hypovolemia; No obvious fluid overload   Other NRE:    DID A NON-ROUTINE EVENT OCCUR?            Last vitals:  Vitals Value Taken Time   /81 12/09/23 1306   Temp 36.1  C (96.9  F) 12/09/23 1224   Pulse 68 12/09/23 1309   Resp 16 12/09/23 1309   SpO2 97 % 12/09/23 1309   Vitals shown include unfiled device data.    Electronically Signed By: Pauly Ocampo  December 9, 2023  1:11 PM

## 2023-12-09 NOTE — PROGRESS NOTES
[facility] Brief Note    ERCP planned for around 11am today pending OR availability.    Nafisa Rai MD  [facility]  [phone]

## 2023-12-09 NOTE — PROVIDER NOTIFICATION
MD Notification    Notified Person: MD    Notified Person Name: Simi Del Castillo    Notification Date/Time: 12/09/23 3:58 PM     Notification Interaction: vocera message    Purpose of Notification: Patient tolerated clear liquid diet post-op. Are you able to change the diet or should I reach out to GI?    Orders Received: You can advance diet as tolerated. Can you let the Surgery team know he's hoping to see them. They might want to take his gallbladder out tomorrow.    Comments:

## 2023-12-09 NOTE — PROVIDER NOTIFICATION
Dr Ocampo updated on BP's after hydralazine administration, current BP: 176/84.   Per Dr Ocampo, pt ok to transfer back to short stay.

## 2023-12-09 NOTE — PROGRESS NOTES
[facility]    Hospitalist Progress Note    Date of Service (when I saw the patient): 12/09/2023  Admit date: 12/7/2023    Interval History   Full details of events over last 24 hours outlined below.   He has remained afebrile hemodynamically stable, with normal oxygenation.  He has had no recurrence of abdominal pain.  Pat underwent ERCP today.  He was told there was sludge in the gallbladder.  See the report below.  A stent was placed and now he is under the understanding that everything has been resolved.   When I explained that the sludge likely originated from the gallbladder and that normally we would recommend removing the gallbladder, he was not sure he agreed with that idea.  He subsequently met with surgery who did not recommend removal of gallbladder at this time.  Denies any SOB, CP, abdominal pain, N/V/D currently.      Assessment & Plan   José Miguel Hightower is a 87 year old male with hyperlipidemia, insomnia, was brought to the ER due to abdominal pain and registered under observation on 12/7/2023     He had presented previously with nausea and vomiting.  That it improved but then developed upper abdominal pain without fever.  No diarrhea, hematochezia or melena.    At presentation he was afebrile, hypertensive at 179/95, oxygenation 98% on room air, pulse normal    Labs now significant for elevated liver enzymes: , , bilirubin 1.7.  (Bilirubin previously normal AST 57 on 12/6) lactic acid normal at 1.  Troponin 23.   CR 1.13 (baseline Cr ~0.85), lytes normal.  WBC normal at 5.4 platelets mildly low at 126 (baseline ~150)  UA: 1 WBC 1 RBC    CT A/P: No acute abnormalities.  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  RUQ U/S: Distended gallbladder.  No gallstones wall thickening or pericystic fluid.  Negative sonographic Rich sign.  Left lobe of liver not well seen.  Normal  parenchyma and smooth contour.      Abdominal pain  Rising liver enzymes and bilirubin  Suspected choledocholithiasis with biliary colic  Was in ER night before with nausea vomiting.  Above improved but now upper abdominal pain without fever.  No diarrhea, hematochezia or melena.  WBC normal.  CT abdomen pelvis showed lobular liver contour with atrophy of left lobe, fibrosis or cirrhosis, distended GB with positive tensile fundus sign. 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     General surgery consulted  Given rising bilirubin on 12/8/23 consulted [facility] as well.   HIDA scan: c/w biliary duct blockage  Underwent ERCP on 12/9/2023 per procedure note:  biliary papillary stenosis benign.  Cannot completely exclude a short stricture in the distal CBD.  Biliary sphincterectomy was performed.  The biliary tree was swept and sludge was found.  Avoid aspirin and NSAIDs for 10 days  Return to GI for ERCP/stent removal in 48 weeks  General surgery met with patient and recommended he advance diet and if tolerates he can be discharged.  After discussing with [facility], Dr. Rai, I  subsequently revisited with patient and son regarding possible discharge tonight. Patient was not under the impression he was leaving tonight. He will try a regular diet tonight with plan for discharge tomorrow.   No signs or symptoms of infection/sepsis.  Has remained afebrile without leukocytosis and hemodynamically stable.  Therefore, no antibiotics have been given during the stay.   If antibiotic started obtained blood cultures x 2 first    Recent Labs   Lab 12/09/23  0651 12/08/23  0644 12/07/23  1958 12/07/23  1847 12/06/23  0046   ALBUMIN  --  3.4*  --  3.9 4.0   BILITOTAL  --  2.6*  --  1.7* 1.1   ALT  --  109*  --  103* 67   AST  --  143*  --  152* 57*   ALKPHOS  --  131  --  127 125   PROTEIN  --   --  Negative  --   --    LIPASE  --   --   --  17 17   INR 1.11   "--   --   --   --    CR  --  0.85  --  1.13 1.05          Celiac artery stenosis, incidental finding  * Lactic acid normal.     Vascular surgery consulted and feels this is asymptomatic due to collateralization.  No vascular intervention recommended.   Suspect this is an incidental finding as patient reports no history of postprandial pain, food avoidance, weight loss     Minimally elevated troponin, not consistent with ACS, Troponin 28 > 23  * No chest pain, or other symptoms to suggest ACS   * EKG sinus and no ischemic changes.   * Echo 12/8: EF 55 to 60%.,  No regional wall motion abnormalities.      Hyperlipidemia  * Not on statin.      Insomnia  PTA PRN ambien ordered.      TABITHA  * Stopped using CPAP     Clinically Significant Risk Factors          # Hypocalcemia: Lowest Ca = 8.3 mg/dL in last 2 days, will monitor and replace as appropriate     # Hypoalbuminemia: Lowest albumin = 3.4 g/dL at 12/8/2023  6:44 AM, will monitor as appropriate   # Thrombocytopenia: Lowest platelets = 110 in last 2 days, will monitor for bleeding          # Obesity: Estimated body mass index is 31.7 kg/m  as calculated from the following:    Height as of this encounter: 1.702 m (5' 7\").    Weight as of this encounter: 91.8 kg (202 lb 6.4 oz)., PRESENT ON ADMISSION              PT/OT: None  Diet: Orders Placed This Encounter      Regular Diet Adult      NPO for Medical/Clinical Reasons Except for: Meds, Ice Chips     IVF: Continue normal saline @100 cc/h.  K4.4  DVT Prophylaxis: Up and walking PCD's  Scott Catheter: Not present    Lines: Peripheral only  Cardiac Monitoring: Not needed  Full Code  Disposition:  Anticipate discharge possibly tomorrow depending on liver enzyme trend.   Communication: Discussed with RN, ANTONETTE, patient, son on 12/09/23  Will call vascular surgery and discussed with surgery and gastroenterology later today.    Simi Del Castillo MD    Hospitalist Service  [facility]  Securely message " with the [employer] Web Console (learn more here)  Text page via ticketstreet Paging/Directory    Medical Decision Making       Over 50 MINUTES SPENT BY ME on the date of service doing chart review, history, exam, documentation & further activities per the note.          -Data reviewed today: I reviewed all new labs and imaging results over the last 24 hours. I personally reviewed no images or EKG's today.    Physical Exam   Temp: 97.5  F (36.4  C) Temp src: Oral BP: (!) 152/73 Pulse: 66   Resp: 17 SpO2: 98 % O2 Device: None (Room air) Oxygen Delivery: 2 LPM  Vitals:    12/07/23 2242 12/08/23 0645 12/09/23 0500   Weight: 93.3 kg (205 lb 11 oz) 91.8 kg (202 lb 6.4 oz) 91.8 kg (202 lb 6.4 oz)     Vital Signs with Ranges  Temp:  [96.9  F (36.1  C)-98.5  F (36.9  C)] 97.5  F (36.4  C)  Pulse:  [60-75] 66  Resp:  [14-22] 17  BP: (138-203)/(66-99) 152/73  SpO2:  [92 %-98 %] 98 %  I/O last 3 completed shifts:  In: 800 [I.V.:800]  Out: 2100 [Urine:2100]    Today's Exam  Constitutional: NAD,   Neuropsyche:  alert and oriented, answers questions appropriately.   Respiratory:  Breathing comfortably, good air exchange, no wheezes, no crackles.   Cardiovascular:  Regular rate and rhythm, no edema.  GI:  soft, NT/ND, BS normal  Skin/Integumen:  No acute rash or sign of bleeding.     Medications   All medications reviewed on 12/08/23     sodium chloride 100 mL/hr at 12/09/23 1430      pantoprazole  40 mg Intravenous Daily with breakfast    zolpidem  2.5 mg Oral At Bedtime     PRN Meds: acetaminophen **OR** acetaminophen, flumazenil, HYDROmorphone, naloxone **OR** naloxone **OR** naloxone **OR** naloxone, ondansetron **OR** ondansetron, oxyCODONE IR, senna-docusate **OR** senna-docusate, zolpidem    Data   Recent Labs   Lab 12/09/23  0651 12/08/23  0644 12/07/23  1847 12/06/23  0046   WBC  --  4.6 5.4 6.3   HGB  --  13.8 14.9 16.4   MCV  --  93 95 93   PLT  --  110* 126* 128*   INR 1.11  --   --   --    NA  --  140 140 141   POTASSIUM  --   4.2 4.4 4.3   CHLORIDE  --  106 103 105   CO2  --  26 27 22   BUN  --  14.6 21.8 21.1   CR  --  0.85 1.13 1.05   ANIONGAP  --  8 10 14   EMILE  --  8.3* 8.6* 8.8   GLC  --  89 135* 179*   ALBUMIN  --  3.4* 3.9 4.0   PROTTOTAL  --  5.8* 6.7 7.0   BILITOTAL  --  2.6* 1.7* 1.1   ALKPHOS  --  131 127 125   ALT  --  109* 103* 67   AST  --  143* 152* 57*   LIPASE  --   --  17 17       Recent Results (from the past 24 hour(s))   XR Surgery ANTHONY Fluoro Less Than 5 Min w Stills    Narrative    This exam was marked as non-reportable because it will not be read by a   radiologist or a [place] non-radiologist provider.

## 2023-12-09 NOTE — ANESTHESIA PREPROCEDURE EVALUATION
Anesthesia Pre-Procedure Evaluation    Patient: José Miguel Hightower   MRN: [id number] : 1935        Procedure : Procedure(s):  ENDOSCOPIC ULTRASOUND, ESOPHAGOSCOPY / UPPER GASTROINTESTINAL TRACT (GI)  ENDOSCOPIC RETROGRADE CHOLANGIOPANCREATOGRAPHY          Past Medical History:   Diagnosis Date    Arthritis       Past Surgical History:   Procedure Laterality Date    BACK SURGERY        No Known Allergies   Social History     Tobacco Use    Smoking status: Never    Smokeless tobacco: Not on file   Substance Use Topics    Alcohol use: Yes     Comment: 2-3 drinks a week      Wt Readings from Last 1 Encounters:   23 91.8 kg (202 lb 6.4 oz)        Anesthesia Evaluation   Pt has had prior anesthetic.     No history of anesthetic complications       ROS/MED HX  ENT/Pulmonary:    (-) tobacco use, asthma and sleep apnea   Neurologic:    (-) no seizures and no CVA   Cardiovascular:     (+)  hypertension- -   -  - -                                      METS/Exercise Tolerance:     Hematologic:       Musculoskeletal:       GI/Hepatic:    (-) GERD   Renal/Genitourinary:       Endo:    (-) Type II DM and thyroid disease   Psychiatric/Substance Use:       Infectious Disease:       Malignancy:       Other:            Physical Exam    Airway        Mallampati: II   TM distance: > 3 FB   Neck ROM: full   Mouth opening: > 3 cm    Respiratory Devices and Support         Dental       (+) Minor Abnormalities - some fillings, tiny chips      Cardiovascular   cardiovascular exam normal          Pulmonary   pulmonary exam normal                OUTSIDE LABS:  CBC:   Lab Results   Component Value Date    WBC 4.6 2023    WBC 5.4 2023    HGB 13.8 2023    HGB 14.9 2023    HCT 40.6 2023    HCT 44.9 2023     (L) 2023     (L) 2023     BMP:   Lab Results   Component Value Date     2023     2023    POTASSIUM 4.2 2023    POTASSIUM 4.4 2023     "CHLORIDE 106 12/08/2023    CHLORIDE 103 12/07/2023    CO2 26 12/08/2023    CO2 27 12/07/2023    BUN 14.6 12/08/2023    BUN 21.8 12/07/2023    CR 0.85 12/08/2023    CR 1.13 12/07/2023    GLC 89 12/08/2023     (H) 12/07/2023     COAGS:   Lab Results   Component Value Date    INR 1.11 12/09/2023     POC: No results found for: \"BGM\", \"HCG\", \"HCGS\"  HEPATIC:   Lab Results   Component Value Date    ALBUMIN 3.4 (L) 12/08/2023    PROTTOTAL 5.8 (L) 12/08/2023     (H) 12/08/2023     (H) 12/08/2023    ALKPHOS 131 12/08/2023    BILITOTAL 2.6 (H) 12/08/2023     OTHER:   Lab Results   Component Value Date    LACT 1.0 12/07/2023    EMILE 8.3 (L) 12/08/2023    MAG 1.9 12/06/2023    LIPASE 17 12/07/2023       Anesthesia Plan    ASA Status:  2       Anesthesia Type: General.     - Airway: ETT   Induction: Intravenous.      Techniques and Equipment:     - Airway: Video-Laryngoscope       Consents    Anesthesia Plan(s) and associated risks, benefits, and realistic alternatives discussed. Questions answered and patient/representative(s) expressed understanding.     - Discussed:     - Discussed with:  Patient            Postoperative Care            Comments:               Pauly Ocampo I have reviewed the pertinent notes and labs in the chart from the past 30 days and (re)examined the patient.  Any updates or changes from those notes are reflected in this note.             "

## 2023-12-09 NOTE — PROVIDER NOTIFICATION
MD Notification    Notified Person: MD    Notified Person Name: Madison Funez    Notification Date/Time: 12/09/23 4:45 PM     Notification Interaction: vocera message     Purpose of Notification: FYI- patient is hoping to see you tomorrow to determine if he is getting his gallbladder out or not    Orders Received:    Comments:

## 2023-12-09 NOTE — ANESTHESIA CARE TRANSFER NOTE
Patient: José Miguel Hightower    Procedure: Procedure(s):  ENDOSCOPIC ULTRASOUND, ESOPHAGOSCOPY / UPPER GASTROINTESTINAL TRACT (GI)  ENDOSCOPIC RETROGRADE CHOLANGIOPANCREATOGRAPHY, sphincterotomy, stent placement       Diagnosis: Elevated liver enzymes [R74.8]  Diagnosis Additional Information: No value filed.    Anesthesia Type:   General     Note:    Oropharynx: oropharynx clear of all foreign objects  Level of Consciousness: awake  Oxygen Supplementation: nasal cannula    Independent Airway: airway patency satisfactory and stable  Dentition: dentition unchanged  Vital Signs Stable: post-procedure vital signs reviewed and stable  Report to RN Given: handoff report given  Patient transferred to: PACU    Handoff Report: Identifed the Patient, Identified the Reponsible Provider, Reviewed the pertinent medical history, Discussed the surgical course, Reviewed Intra-OP anesthesia mangement and issues during anesthesia, Set expectations for post-procedure period and Allowed opportunity for questions and acknowledgement of understanding    Vitals:  Vitals Value Taken Time   /66 12/09/23 1224   Temp     Pulse 78 12/09/23 1226   Resp 13 12/09/23 1226   SpO2 96 % 12/09/23 1226   Vitals shown include unfiled device data.    Electronically Signed By: DONOVAN Salgado CRNA  December 9, 2023  12:27 PM   Warfarin refilled per protocol based on 5/30/19 INR of 2.3. A 3-month supply of medication with 0 refills was ordered.    Patient will recheck INR on 6/27/19 at home and results will be called in.     As of June 1, 2019  patient needed 90 day prescriptions sent to [facility].

## 2023-12-09 NOTE — ANESTHESIA PROCEDURE NOTES
Airway       Patient location during procedure: OR       Procedure Start/Stop Times: 12/9/2023 11:40 AM  Staff -        CRNA: Nano Lovelace APRN CRNA       Performed By: CRNA  Consent for Airway        Urgency: elective  Indications and Patient Condition       Indications for airway management: rowena-procedural       Induction type:intravenous       Mask difficulty assessment: 1 - vent by mask    Final Airway Details       Final airway type: endotracheal airway       Successful airway: ETT - single  Endotracheal Airway Details        ETT size (mm): 8.0       Successful intubation technique: video laryngoscopy       VL Blade Size: Cowart 4       Grade View of Cords: 1       Adjucts: stylet       Position: Center       Measured from: gums/teeth       Secured at (cm): 24       Bite block used: Molar    Post intubation assessment        Placement verified by: capnometry and equal breath sounds        Number of attempts at approach: 1       Secured with: tape       Ease of procedure: easy       Dentition: Intact and Unchanged    Medication(s) Administered   Medication Administration Time: 12/9/2023 11:40 AM

## 2023-12-10 VITALS
HEIGHT: 67 IN | RESPIRATION RATE: 18 BRPM | DIASTOLIC BLOOD PRESSURE: 78 MMHG | OXYGEN SATURATION: 96 % | SYSTOLIC BLOOD PRESSURE: 163 MMHG | WEIGHT: 202.4 LBS | HEART RATE: 61 BPM | TEMPERATURE: 97.5 F | BODY MASS INDEX: 31.77 KG/M2

## 2023-12-10 LAB
ALBUMIN SERPL BCG-MCNC: 3.6 G/DL (ref 3.5–5.2)
ALP SERPL-CCNC: 264 U/L (ref 40–150)
ALT SERPL W P-5'-P-CCNC: 157 U/L (ref 0–70)
ANION GAP SERPL CALCULATED.3IONS-SCNC: 10 MMOL/L (ref 7–15)
AST SERPL W P-5'-P-CCNC: 157 U/L (ref 0–45)
BILIRUB SERPL-MCNC: 2.7 MG/DL
BUN SERPL-MCNC: 11.1 MG/DL (ref 8–23)
CALCIUM SERPL-MCNC: 8.6 MG/DL (ref 8.8–10.2)
CHLORIDE SERPL-SCNC: 108 MMOL/L (ref 98–107)
CREAT SERPL-MCNC: 0.82 MG/DL (ref 0.67–1.17)
DEPRECATED HCO3 PLAS-SCNC: 25 MMOL/L (ref 22–29)
EGFRCR SERPLBLD CKD-EPI 2021: 86 ML/MIN/1.73M2
GLUCOSE SERPL-MCNC: 100 MG/DL (ref 70–99)
HGB BLD-MCNC: 14.7 G/DL (ref 13.3–17.7)
POTASSIUM SERPL-SCNC: 4.1 MMOL/L (ref 3.4–5.3)
PROT SERPL-MCNC: 6.4 G/DL (ref 6.4–8.3)
SODIUM SERPL-SCNC: 143 MMOL/L (ref 135–145)

## 2023-12-10 PROCEDURE — 85018 HEMOGLOBIN: CPT | Performed by: INTERNAL MEDICINE

## 2023-12-10 PROCEDURE — 999N000128 HC STATISTIC PERIPHERAL IV START W/O US GUIDANCE

## 2023-12-10 PROCEDURE — 258N000003 HC RX IP 258 OP 636: Performed by: HOSPITALIST

## 2023-12-10 PROCEDURE — 36415 COLL VENOUS BLD VENIPUNCTURE: CPT | Performed by: INTERNAL MEDICINE

## 2023-12-10 PROCEDURE — 80053 COMPREHEN METABOLIC PANEL: CPT | Performed by: INTERNAL MEDICINE

## 2023-12-10 PROCEDURE — 99239 HOSP IP/OBS DSCHRG MGMT >30: CPT | Performed by: INTERNAL MEDICINE

## 2023-12-10 PROCEDURE — 250N000013 HC RX MED GY IP 250 OP 250 PS 637: Performed by: HOSPITALIST

## 2023-12-10 RX ADMIN — SODIUM CHLORIDE: 9 INJECTION, SOLUTION INTRAVENOUS at 00:40

## 2023-12-10 RX ADMIN — ZOLPIDEM TARTRATE 2.5 MG: 5 TABLET, FILM COATED ORAL at 00:40

## 2023-12-10 ASSESSMENT — ACTIVITIES OF DAILY LIVING (ADL)
ADLS_ACUITY_SCORE: 33
ADLS_ACUITY_SCORE: 35
ADLS_ACUITY_SCORE: 33

## 2023-12-10 NOTE — PROGRESS NOTES
"GASTROENTEROLOGY PROGRESS NOTE    SUBJECTIVE:  Feeling much better today.    OBJECTIVE:    BP (!) 163/78 (BP Location: Right arm)   Pulse 61   Temp 97.5  F (36.4  C) (Oral)   Resp 18   Ht 1.702 m (5' 7\")   Wt 91.8 kg (202 lb 6.4 oz)   SpO2 96%   BMI 31.70 kg/m    Temp (24hrs), Av.4  F (36.3  C), Min:96.9  F (36.1  C), Max:98  F (36.7  C)    Patient Vitals for the past 72 hrs:   Weight   23 0500 91.8 kg (202 lb 6.4 oz)   23 0645 91.8 kg (202 lb 6.4 oz)   23 2242 93.3 kg (205 lb 11 oz)   23 1838 89.8 kg (198 lb)       Intake/Output Summary (Last 24 hours) at 12/10/2023 1031  Last data filed at 2023 1407  Gross per 24 hour   Intake 800 ml   Output 600 ml   Net 200 ml         PHYSICAL EXAM    Constitutional: NAD, comfortable          Additional Comments:  ROS, FH, SH: See initial GI consult for details.    I have reviewed the patient's new clinical lab results:    Recent Labs   Lab Test 12/10/23  0716 12/09/23  0651 12/08/23  0644 12/07/23  1847 12/06/23  0046   WBC  --   --  4.6 5.4 6.3   HGB 14.7  --  13.8 14.9 16.4   MCV  --   --  93 95 93   PLT  --   --  110* 126* 128*   INR  --  1.11  --   --   --      Recent Labs   Lab Test 12/10/23  0716 12/08/23  0644 12/07/23  1847    140 140   POTASSIUM 4.1 4.2 4.4   CHLORIDE 108* 106 103   CO2 25 26 27   BUN 11.1 14.6 21.8   CR 0.82 0.85 1.13   ANIONGAP 10 8 10   EMILE 8.6* 8.3* 8.6*     Recent Labs   Lab Test 12/10/23  0716 12/08/23  0644 23  0046   ALBUMIN 3.6 3.4*  --  3.9 4.0   BILITOTAL 2.7* 2.6*  --  1.7* 1.1   * 109*  --  103* 67   * 143*  --  152* 57*   ALKPHOS 264* 131  --  127 125   PROTEIN  --   --  Negative  --   --    LIPASE  --   --   --  17 17         Principal Problem:    Elevated liver enzymes    Assessment: Patient admitted with abdominal pain and elevated LFts. ERCP  with removal of sludge from CBD, stent placed due to question of mild stenosis.  No plans to " remove gallbladder, patient discussed with surgery.  LFTs could be up today from procedure yesterday, patient feeling much better.      Plan:   - EGD in 4-6 weeks to remove stent   - repeat LFTs with PCP later this week          Nafisa Rai  [facility]  Office:  [phone]    Approximately 15 minutes of total time was spent providing patient care, including patient evaluation, reviewing documentation/test results, and .

## 2023-12-10 NOTE — PROVIDER NOTIFICATION
MD Notification    Notified Person: MD    Notified Person Name: Simi Del Castillo     Notification Date/Time: 12/10/23 7:46 AM     Notification Interaction: vocera message     Purpose of Notification: Patient's IV went bad overnight, does he need a new one placed? His tele was discontinued?   His protonix is IV and he still has cont. fluids. Looks like night put in a vascular access consult   Do you want me to cancel the vascular access order?   Also fyi his BPs are still running a bit high 163/78 this morning     Orders Received: I'm just awaiting labs and then I'll put in his discharge orders and come and talk to him Stop IV fluids stop all IV vents   Yes, definitely who ordered that? Yes, I noted that. That's OK. OK. Regarding his high blood pressure I just checked his clinic visits and his blood pressure is always well-controlled, so no need to start a new medication before discharge.   Comments:

## 2023-12-10 NOTE — PROGRESS NOTES
PRIMARY Concern: Admitted for Abdominal pain, elevated enzymes/ bilirubin and possible choledocholithiasis with biliary colic   SAFETY RISK Concerns (fall risk, behaviors, etc.): Fall      Isolation/Type: None  Tests/Procedures for NEXT shift: AM Labs,  Consults? (Pending/following, signed-off?), GI following  Where is patient from? (Home, TCU, etc.): Home  Other Important info for NEXT shift:   Anticipated DC date & active delays: Possible discharge tomorrow 12/10 pending lab result for liver enzyme.     SUMMARY NOTE:     [id number]  Orientation/Cognitive: A/O x4  Observation Goals (Met/ Not Met): inpt  Mobility Level/Assist Equipment: Ax1/GB/W. Up in chair.  Antibiotics & Plan (IV/po, length of tx left): None  Pain Management: Denies. PRNs available   Tele/VS/O2: VSS on RA, ex HTN improving.  ABNL Lab/BG: Elevated LFTs  Diet: Reg  Bowel/Bladder: Cont. No BM this shift. Up to bathroom.  Skin Concerns: None  Drains/Devices: NS @ 100/hr  Patient Stated Goal for Today: Rest

## 2023-12-10 NOTE — DISCHARGE SUMMARY
"[facility]  Hospitalist Discharge Summary      Date of Admission:  12/7/2023  Date of Discharge:  12/10/2023  Discharging Provider: Simi Del Castillo MD  Discharge Service: Hospitalist Service    Discharge Diagnoses   Abdominal pain  Cholestasis on HIDA with elevated liver enzymes and bilirubin  S/p ERCP with stent placement on 12/9/23   Sludge in the CBD with potential CBD stenosis  Celiac artery stenosis with good collaterals, incidental finding  Minimally elevated troponin, not consistent with ACS  Hyperlipidemia  Insomnia  TABITHA  Elevated BP during stay    Clinically Significant Risk Factors     # Obesity: Estimated body mass index is 31.7 kg/m  as calculated from the following:    Height as of this encounter: 1.702 m (5' 7\").    Weight as of this encounter: 91.8 kg (202 lb 6.4 oz).       Follow-ups Needed After Discharge   Follow-up Appointments     Follow-up and recommended labs and tests       Follow up with primary care provider, Philip Reed, within 7   days for hospital follow- up.  The following labs/tests are recommended:   CMP, hgb, plts.    Return to this [facility] for ERCP/stent removal in 4-8 weeks.            Unresulted Labs Ordered in the Past 30 Days of this Admission       No orders found from 11/7/2023 to 12/8/2023.        None    Discharge Disposition   Discharged to home  Condition at discharge: Good    Hospital Course   José Miguel Hightower is a 87 year old male with hyperlipidemia, insomnia, was brought to the ER due to abdominal pain and registered under observation on 12/7/2023     He had presented previously with nausea and vomiting.  That it improved but then developed upper abdominal pain without fever.  No diarrhea, hematochezia or melena.    At presentation he was afebrile, hypertensive at 179/95, oxygenation 98% on room air, pulse normal    Labs now significant for elevated liver enzymes: , , bilirubin 1.7.  (Bilirubin previously normal AST 57 on " 12/6) lactic acid normal at 1.  Troponin 23.   CR 1.13 (baseline Cr ~0.85), lytes normal.  WBC normal at 5.4 platelets mildly low at 126 (baseline ~150)  UA: 1 WBC 1 RBC    CT A/P: No acute abnormalities.  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  RUQ U/S: Distended gallbladder.  No gallstones wall thickening or pericystic fluid.  Negative sonographic Rich sign.  Left lobe of liver not well seen.  Normal parenchyma and smooth contour.      Abdominal pain  Cholestasis on HIDA with elevated liver enzymes and bilirubin  S/p ERCP with stent placement on 12/9/23   Sludge in the CBD with potential CBD stenosis  Was in ER night before with nausea vomiting.  Above improved but now upper abdominal pain without fever.  No diarrhea, hematochezia or melena.  WBC normal.  CT abdomen pelvis showed lobular liver contour with atrophy of left lobe, fibrosis or cirrhosis, distended GB with positive tensile fundus sign. 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     General surgery consulted  Given rising bilirubin on 12/8/23 consulted [facility] as well.   HIDA scan: c/w biliary duct blockage  Underwent ERCP on 12/9/2023 per procedure note:  biliary papillary stenosis benign.  Cannot completely exclude a short stricture in the distal CBD.  Biliary sphincterectomy was performed.  The biliary tree was swept and sludge was found.  Avoid aspirin and NSAIDs for 10 days  Return to GI for ERCP/stent removal in 4 to 8 weeks  General surgery, Dr. Fernandez, met with patient on 12/9/23 and did not recommend cholecystectomy at this time.  We discussed this may be recommended in the future.   No signs or symptoms of infection/sepsis.  Has remained afebrile without leukocytosis and hemodynamically stable.  Therefore, no antibiotics have been given during the stay.   Discussed with [facility], Dr. Rai,  patient and son on day of discharge.   Liver enzymes are likely still up from procedure yesterday. He may discharge to home with liver panel next week with PCP. Discussed he needs follow up with [facility] for stent removal in 4 to 8 weeks.     Recent Labs   Lab 12/10/23  0716 12/09/23  0651 12/08/23  0644 12/07/23  1958 12/07/23  1847 12/06/23  0046   ALBUMIN 3.6  --  3.4*  --  3.9 4.0   BILITOTAL 2.7*  --  2.6*  --  1.7* 1.1   *  --  109*  --  103* 67   *  --  143*  --  152* 57*   ALKPHOS 264*  --  131  --  127 125   PROTEIN  --   --   --  Negative  --   --    LIPASE  --   --   --   --  17 17   INR  --  1.11  --   --   --   --    CR 0.82  --  0.85  --  1.13 1.05         Celiac artery stenosis with good collaterals, incidental finding  * Lactic acid normal.     Vascular surgery consulted and feels this is asymptomatic due to collateralization.  No vascular intervention recommended.   Suspect this is an incidental finding as patient reports no history of postprandial pain, food avoidance, weight loss     Minimally elevated troponin, not consistent with ACS, Troponin 28 > 23  * No chest pain, or other symptoms to suggest ACS   * EKG sinus and no ischemic changes.   * Echo 12/8: EF 55 to 60%.,  No regional wall motion abnormalities.      Hyperlipidemia  * Not on statin.      Insomnia  PTA PRN ambien ordered.      TABITHA  * Stopped using CPAP    Elevated BP during stay  * He had SBP th 160s during his stay. He is asymptomatic. BP has been well-controlled at recent clinic visits. Therefore, we did not start a new medications. Cr and lytes normal during this stay.        Consultations This Hospital Stay   VASCULAR SURGERY IP CONSULT  SURGERY GENERAL IP CONSULT  GASTROENTEROLOGY IP CONSULT  VASCULAR ACCESS ADULT IP CONSULT    Code Status   Full Code    Time Spent on this Encounter   ISimi MD, personally saw the patient today and spent greater than 30 minutes discharging this patient.       Simi GALE  MD [place]  [facility]  [address] [address] [address]  Phone: [phone]  ______________________________________________________________________    Physical Exam   Vital Signs: Temp: 97.5  F (36.4  C) Temp src: Oral BP: (!) 163/78 Pulse: 61   Resp: 18 SpO2: 96 % O2 Device: None (Room air) Oxygen Delivery: 2 LPM  Weight: 202 lbs 6.4 oz  Constitutional: Calm, NAD,   Neuropsyche:  alert and oriented, answers questions appropriately. Speech normal, face symmetric and moving all 4 extremities without gross focal neurological deficit.   Respiratory:  Breathing comfortably, good air exchange, no wheezes, no crackles.   Cardiovascular:  Regular rate and rhythm, no edema.  GI:  soft, NT/ND, BS normal  Skin/Integumen:  No acute rash or sign of bleeding.          Primary Care Physician   Philip Reed    Discharge Orders      Reason for your hospital stay    You were admitted with episodic upper abdominal pain associated with liver enzyme and bilirubin elevation and imaging showed obstruction of your common bile duct.  You underwent a procedure called ERCP and sludge was removed from the common bile duct and a stent was placed.  This stent needs to be removed in 4 to 8 weeks.     Activity    Your activity upon discharge: activity as tolerated     Follow-up and recommended labs and tests     Follow up with primary care provider, Philip Reed, within 7 days for hospital follow- up.  The following labs/tests are recommended: CMP, hgb, plts sometime this week.    Return to this [facility] for ERCP/stent removal in 4-8 weeks.     Discharge Instructions    Avoid aspirin and NSAIDs for 10 days.     Diet    Follow this diet upon discharge: Orders Placed This Encounter      Regular Diet Adult       Significant Results and Procedures   Most Recent 3 CBC's:  Recent Labs   Lab Test 12/10/23  0716 12/08/23  0644 12/07/23  1847 12/06/23  0046   WBC  --  4.6 5.4  6.3   HGB 14.7 13.8 14.9 16.4   MCV  --  93 95 93   PLT  --  110* 126* 128*     Most Recent 3 BMP's:  Recent Labs   Lab Test 12/10/23  0716 12/08/23  0644 12/07/23  1847    140 140   POTASSIUM 4.1 4.2 4.4   CHLORIDE 108* 106 103   CO2 25 26 27   BUN 11.1 14.6 21.8   CR 0.82 0.85 1.13   ANIONGAP 10 8 10   EMILE 8.6* 8.3* 8.6*   * 89 135*     Most Recent 2 LFT's:  Recent Labs   Lab Test 12/10/23  0716 12/08/23  0644   * 143*   * 109*   ALKPHOS 264* 131   BILITOTAL 2.7* 2.6*   ,   Results for orders placed or performed during the hospital encounter of 12/07/23   CT Abdomen Pelvis w Contrast    Narrative    EXAM: CT ABDOMEN PELVIS W CONTRAST  LOCATION: [facility]  DATE: 12/7/2023    INDICATION: acute epigastric pain, no prior abd surgery  COMPARISON: None.  TECHNIQUE: CT scan of the abdomen and pelvis was performed following injection of IV contrast. Multiplanar reformats were obtained. Dose reduction techniques were used.  CONTRAST: 100 mL Isovue 370    FINDINGS:     LOWER CHEST: Bibasilar atelectasis. Coronary calcifications.     HEPATOBILIARY: Lobular liver contour with atrophy of the left lobe suspicious for some degree of fibrosis or cirrhosis. No biliary dilation. Gallbladder distention with positive tensile fundus sign (series 3 image 73).     PANCREAS: Normal.     SPLEEN: Normal.     ADRENAL GLANDS: Normal.     KIDNEYS/BLADDER: Subcentimeter hypoattenuating lesions are too small to characterize. No hydronephrosis. Normal urinary bladder.      BOWEL: No obstruction. Normal appendix. Colonic diverticulosis. No pneumoperitoneum or free fluid.     LYMPH NODES: No pathologically enlarged lymph nodes.    VASCULATURE: Nonaneurysmal aorta with severe aortoiliac calcifications. 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 Likely at least mild-to-moderate stenosis of the bilateral renal arteries near the   origins. Patent  portal, splenic, and superior mesenteric veins.     PELVIC ORGANS: Prostatomegaly.     MUSCULOSKELETAL: Multilevel degenerative disc disease of the thoracolumbar spine. Small, fat-containing left inguinal hernia.       Impression    IMPRESSION:  1.  No acute abnormalities in the abdomen or pelvis.  2.  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  3.  Prostatomegaly.  4.  Chronic and ancillary findings as described   US Abdomen Limited    Narrative    EXAM: US ABDOMEN LIMITED  LOCATION: [facility]  DATE: 12/8/2023    INDICATION: abd pain, elvated LFT, abnormal CT  COMPARISON: None.  TECHNIQUE: Limited abdominal ultrasound.    FINDINGS:    GALLBLADDER: Gallbladder is distended. No gallstones, wall thickening, or pericholecystic fluid. Negative sonographic Rich's sign.    BILE DUCTS: No biliary dilatation. The common duct measures 6 mm.    LIVER: Left lobe of the liver not well seen secondary to positioning and bowel gas. Normal parenchyma with smooth contour. No focal mass.    RIGHT KIDNEY: No hydronephrosis.    PANCREAS: Not well seen secondary to bowel gas. The visualized portions are normal.    No ascites.      Impression    IMPRESSION:  1.  Gallbladder is distended. No gallstones, wall thickening, or pericholecystic fluid. Negative sonographic Rich's sign.      2.  Left lobe of the liver not well seen secondary to positioning and bowel gas. Normal parenchyma with smooth contour. No focal mass.    3.  Right upper quadrant ultrasound otherwise unremarkable.         NM HepatOBiliary Scan    Narrative    EXAM: NM HEPATOBILIARY SCAN  LOCATION: [facility]  DATE: 12/8/2023    INDICATION: Elevated LFTs. Right upper quadrant abdominal pain.  COMPARISON: Abdominal ultrasound dated 12/08/2023  TECHNIQUE: 7.0 mCi of Tc-99m mebrofenin, IV. Anterior planar imaging of the abdomen.     FINDINGS: Radiotracer activity in liver  parenchyma without visualization of the gallbladder, bile ducts, or small bowel typical of a cholestatic pattern either representing sequelae of a common bile duct obstruction or severe hepatic dysfunction. 24 hour   delayed images may be of additional diagnostic value      Impression    IMPRESSION:     Scintigraphic findings of a cholestatic pattern, which can be seen with a common bile duct obstruction or severe hepatic dysfunction. 24 hour delayed images may be of additional diagnostic value.   XR Surgery ANTHONY Fluoro Less Than 5 Min w Stills    Narrative    This exam was marked as non-reportable because it will not be read by a   radiologist or a [place] non-radiologist provider.         Echocardiogram Complete     Value    LVEF  55-60%    Narrative    [id number]  [id number]  [id number]  [id number]^ULISES^COLLIN^R     [facility]  Echocardiography Laboratory  [address] [place] [address]     Name: TIMOTHY RICO  MRN: [id number]  : 1935  Study Date: 2023 07:15 AM  Age: 87 yrs  Gender: Male  Patient Location: [facility]  Reason For Study: Other, Please Specify in Comments  Ordering Physician: COLLIN MONTGOMERY  Performed By: Aisha Madden     BSA: 2.0 m2  Height: 67 in  Weight: 198 lb  HR: 64  BP: 175/86 mmHg  ______________________________________________________________________________  Procedure  Complete Portable Echo Adult. Optison (NDC #[id number]) given intravenously.  ______________________________________________________________________________  Interpretation Summary     Left ventricular systolic function is normal.  The visual ejection fraction is 55-60%.  No regional wall motion abnormalities noted.  The study was technically adequate. There is no comparison study available.  ______________________________________________________________________________  Left Ventricle  The left ventricle is normal in size. There is mild concentric left  ventricular hypertrophy. Left  ventricular systolic function is normal. The  visual ejection fraction is 55-60%. Diastolic Doppler findings (E/E' ratio  and/or other parameters) suggest left ventricular filling pressures are  normal. No regional wall motion abnormalities noted.     Right Ventricle  The right ventricle is normal size. The right ventricular systolic function is  normal.     Atria  Normal left atrial size. Right atrial size is normal.     Aortic Valve  There is mild trileaflet aortic sclerosis. No aortic stenosis is present.     Pulmonic Valve  The pulmonic valve is not well visualized.     Vessels  The aortic root is normal size.     Pericardium  There is no pericardial effusion.     Rhythm  Sinus rhythm was noted.     ______________________________________________________________________________  MMode/2D Measurements & Calculations  IVSd: 1.2 cm  LVIDd: 4.8 cm  LVIDs: 3.3 cm  LVPWd: 1.2 cm  FS: 31.1 %  LV mass(C)d: 216.3 grams  LV mass(C)dI: 107.4 grams/m2  Ao root diam: 3.4 cm  LA dimension: 3.2 cm  asc Aorta Diam: 3.5 cm     LA/Ao: 0.96  Ao root diam index Ht(cm/m): 2.0  Ao root diam index BSA (cm/m2): 1.7  Asc Ao diam index BSA (cm/m2): 1.7  Asc Ao diam index Ht(cm/m): 2.0  LA Volume (BP): 36.7 ml  LA Volume Index (BP): 18.3 ml/m2  RV Base: 4.5 cm  RWT: 0.50  TAPSE: 2.1 cm     Doppler Measurements & Calculations  MV E max raymond: 56.1 cm/sec  MV A max raymond: 73.4 cm/sec  MV E/A: 0.76  MV dec slope: 279.4 cm/sec2  MV dec time: 0.20 sec  PA acc time: 0.07 sec  E/E' av.2  Lateral E/e': 3.4  Medial E/e': 7.1  RV S Raymond: 8.4 cm/sec     ______________________________________________________________________________  Report approved by: Racheal Ruano 2023 10:19 AM             Discharge Medications   Current Discharge Medication List        CONTINUE these medications which have NOT CHANGED    Details   acetaminophen (QC ACETAMINOPHEN 8 HOURS) 650 MG CR tablet Take 650 mg by mouth 3 times daily      zolpidem (AMBIEN) 10 MG  tablet Take 3.33 mg by mouth Twice to three times throughout the night. Patient takes a third of a zolpidem (3.33mg at 2300 and then 0230 and/or 0430)    Patient is ok with taking 2.5mg while hospitalized and then having an additional dose on hand if needed since 3.33mg not an available pre-packed strength)      ondansetron (ZOFRAN ODT) 4 MG ODT tab Take 1 tablet (4 mg) by mouth every 8 hours as needed for nausea or vomiting  Qty: 12 tablet, Refills: 0           Allergies   No Known Allergies

## 2023-12-10 NOTE — PLAN OF CARE
PRIMARY Concern: Admitted for Abdominal pain, elevated enzymes/ bilirubin and possible choledocholithiasis with biliary colic   SAFETY RISK Concerns (fall risk, behaviors, etc.): Fall      Isolation/Type: None  Tests/Procedures for NEXT shift: AM Labs,  Consults? (Pending/following, signed-off?) Signed- off completed   Where is patient from? (Home, TCU, etc.): Home  Other Important info for NEXT shift: Pt had a ERCP and stent placement on 12/09.   Anticipated DC date & active delays: Possible discharge today.     SUMMARY NOTE:    Orientation/Cognitive: A/O x4  Observation Goals (Met/ Not Met): inpt  Mobility Level/Assist Equipment: Ax1/GB/W. Up in chair.  Antibiotics & Plan (IV/po, length of tx left): None  Pain Management: Denies. PRNs available   Tele/VS/O2: VSS on RA, ex HTN  ABNL Lab/BG: Elevated LFTs  Diet: Reg  Bowel/Bladder: Cont. No BM this shift. Passing gas. Up to bathroom.  Skin Concerns: None  Drains/Devices:   Patient Stated Goal for Today: To be discharge     VSS RA, no complaints of chest pain or SOB. PIV was removed. AVS gone through with patient and son with all questions and concerns answered. Patient collected all belongings and was brought down to door 2 via our transport aide wheelchair.

## 2023-12-10 NOTE — PROGRESS NOTES
PRIMARY Concern: Admitted for Abdominal pain, elevated enzymes/ bilirubin and possible choledocholithiasis with biliary colic   SAFETY RISK Concerns (fall risk, behaviors, etc.): Fall      Isolation/Type: None  Tests/Procedures for NEXT shift: AM Labs,  Consults? (Pending/following, signed-off?) Signed- off completed   Where is patient from? (Home, TCU, etc.): Home  Other Important info for NEXT shift: Pt had a ERCP and stent placement on 12/09.   Anticipated DC date & active delays: Possible discharge today.     SUMMARY NOTE:     [id number]  Orientation/Cognitive: A/O x4  Observation Goals (Met/ Not Met): inpt  Mobility Level/Assist Equipment: Ax1/GB/W. Up in chair.  Antibiotics & Plan (IV/po, length of tx left): None  Pain Management: Denies. PRNs available   Tele/VS/O2: VSS on RA, ex HTN  ABNL Lab/BG: Elevated LFTs  Diet: Reg  Bowel/Bladder: Cont. No BM this shift. Up to bathroom.  Skin Concerns: None  Drains/Devices: NS @ 100/hr  Patient Stated Goal for Today: To be discharge

## 2023-12-11 ENCOUNTER — PATIENT OUTREACH (OUTPATIENT)
Dept: CARE COORDINATION | Facility: CLINIC | Age: 88
End: 2023-12-11
Payer: COMMERCIAL

## 2023-12-11 NOTE — PROGRESS NOTES
"Clinic Care Coordination Contact  [facility]: Post-Discharge Note  SITUATION                                                      Admission:    Admission Date: 12/07/23   Reason for Admission: Elevated liver enzymes  Discharge:   Discharge Date: 12/10/23  Discharge Diagnosis: Elevated liver enzymes    BACKGROUND                                                      Per hospital discharge summary and inpatient provider notes:    José Miguel Hightower is a 87 year old male with hyperlipidemia, insomnia, was brought to the ER due to abdominal pain and registered under observation on 12/7/2023    ASSESSMENT           Discharge Assessment  How are you doing now that you are home?: \" Doing okay I'm doing fine\"  How are your symptoms? (Red Flag symptoms escalate to triage hotline per guidelines): Improved  Do you feel your condition is stable enough to be safe at home until your provider visit?: Yes  Does the patient have their discharge instructions? : Yes  Does the patient have questions regarding their discharge instructions? : No  Were you started on any new medications or were there changes to any of your previous medications? : No  Does the patient have all of their medications?: Yes  Do you have questions regarding any of your medications? : No  Do you have all of your needed medical supplies or equipment (DME)?  (i.e. oxygen tank, CPAP, cane, etc.): Yes  Discharge follow-up appointment scheduled within 14 calendar days? : Yes  Discharge Follow Up Appointment Date: 12/21/23  Discharge Follow Up Appointment Scheduled with?: Specialty Care Provider  Is patient agreeable to assistance with scheduling? : No    Post-op ([facility] CTA Only)  If the patient had a surgery or procedure, do they have any questions for a nurse?: Yes (see comment)           PLAN                                                      Outpatient Plan:     Your activity upon discharge: activity as tolerated          Follow-up and recommended labs and " tests      Follow up with primary care provider, Philip Reed, within 7 days for hospital follow- up.  The following labs/tests are recommended: CMP, hgb, plts sometime this week.    Return to this [facility] for ERCP/stent removal in 4-8 weeks.          Discharge Instructions     Avoid aspirin and NSAIDs for 10 days.          Diet     Follow this diet upon discharge: Orders Placed This Encounter      Regular Diet Adult        No future appointments.      For any urgent concerns, please contact our 24 hour nurse triage line: [phone] ([phone])         Lissa Diaz MA

## 2024-02-02 ENCOUNTER — HOSPITAL ENCOUNTER (OUTPATIENT)
Facility: CLINIC | Age: 89
Discharge: HOME | End: 2024-02-02
Attending: INTERNAL MEDICINE | Admitting: INTERNAL MEDICINE
Payer: COMMERCIAL

## 2024-02-02 ENCOUNTER — ANESTHESIA (OUTPATIENT)
Dept: GASTROENTEROLOGY | Facility: CLINIC | Age: 89
End: 2024-02-02
Payer: COMMERCIAL

## 2024-02-02 ENCOUNTER — ANESTHESIA EVENT (OUTPATIENT)
Dept: GASTROENTEROLOGY | Facility: CLINIC | Age: 89
End: 2024-02-02
Payer: COMMERCIAL

## 2024-02-02 VITALS
OXYGEN SATURATION: 96 % | SYSTOLIC BLOOD PRESSURE: 163 MMHG | DIASTOLIC BLOOD PRESSURE: 89 MMHG | HEART RATE: 77 BPM | HEIGHT: 67 IN | WEIGHT: 202 LBS | BODY MASS INDEX: 31.71 KG/M2 | RESPIRATION RATE: 21 BRPM

## 2024-02-02 LAB — UPPER GI ENDOSCOPY: NORMAL

## 2024-02-02 PROCEDURE — 370N000017 HC ANESTHESIA TECHNICAL FEE, PER MIN: Performed by: INTERNAL MEDICINE

## 2024-02-02 PROCEDURE — 999N000010 HC STATISTIC ANES STAT CODE-CRNA PER MINUTE: Performed by: INTERNAL MEDICINE

## 2024-02-02 PROCEDURE — 250N000011 HC RX IP 250 OP 636: Performed by: NURSE ANESTHETIST, CERTIFIED REGISTERED

## 2024-02-02 PROCEDURE — 258N000003 HC RX IP 258 OP 636: Performed by: NURSE ANESTHETIST, CERTIFIED REGISTERED

## 2024-02-02 PROCEDURE — 43235 EGD DIAGNOSTIC BRUSH WASH: CPT | Performed by: INTERNAL MEDICINE

## 2024-02-02 RX ORDER — LIDOCAINE 40 MG/G
CREAM TOPICAL
Status: DISCONTINUED | OUTPATIENT
Start: 2024-02-02 | End: 2024-02-02 | Stop reason: HOSPADM

## 2024-02-02 RX ORDER — OMEPRAZOLE 40 MG/1
40 CAPSULE, DELAYED RELEASE ORAL
COMMUNITY
Start: 2023-12-18

## 2024-02-02 RX ORDER — NALOXONE HYDROCHLORIDE 0.4 MG/ML
0.2 INJECTION, SOLUTION INTRAMUSCULAR; INTRAVENOUS; SUBCUTANEOUS
Status: DISCONTINUED | OUTPATIENT
Start: 2024-02-02 | End: 2024-02-02 | Stop reason: HOSPADM

## 2024-02-02 RX ORDER — PROPOFOL 10 MG/ML
INJECTION, EMULSION INTRAVENOUS CONTINUOUS PRN
Status: DISCONTINUED | OUTPATIENT
Start: 2024-02-02 | End: 2024-02-02

## 2024-02-02 RX ORDER — PROPOFOL 10 MG/ML
INJECTION, EMULSION INTRAVENOUS PRN
Status: DISCONTINUED | OUTPATIENT
Start: 2024-02-02 | End: 2024-02-02

## 2024-02-02 RX ORDER — FLUMAZENIL 0.1 MG/ML
0.2 INJECTION, SOLUTION INTRAVENOUS
Status: DISCONTINUED | OUTPATIENT
Start: 2024-02-02 | End: 2024-02-02 | Stop reason: HOSPADM

## 2024-02-02 RX ORDER — SODIUM CHLORIDE, SODIUM LACTATE, POTASSIUM CHLORIDE, CALCIUM CHLORIDE 600; 310; 30; 20 MG/100ML; MG/100ML; MG/100ML; MG/100ML
INJECTION, SOLUTION INTRAVENOUS CONTINUOUS PRN
Status: DISCONTINUED | OUTPATIENT
Start: 2024-02-02 | End: 2024-02-02

## 2024-02-02 RX ORDER — NALOXONE HYDROCHLORIDE 0.4 MG/ML
0.4 INJECTION, SOLUTION INTRAMUSCULAR; INTRAVENOUS; SUBCUTANEOUS
Status: DISCONTINUED | OUTPATIENT
Start: 2024-02-02 | End: 2024-02-02 | Stop reason: HOSPADM

## 2024-02-02 RX ADMIN — SODIUM CHLORIDE, POTASSIUM CHLORIDE, SODIUM LACTATE AND CALCIUM CHLORIDE: 600; 310; 30; 20 INJECTION, SOLUTION INTRAVENOUS at 13:00

## 2024-02-02 RX ADMIN — PROPOFOL 150 MCG/KG/MIN: 10 INJECTION, EMULSION INTRAVENOUS at 13:06

## 2024-02-02 RX ADMIN — PROPOFOL 40 MG: 10 INJECTION, EMULSION INTRAVENOUS at 13:10

## 2024-02-02 ASSESSMENT — ENCOUNTER SYMPTOMS
SEIZURES: 0
DYSRHYTHMIAS: 0

## 2024-02-02 ASSESSMENT — LIFESTYLE VARIABLES: TOBACCO_USE: 0

## 2024-02-02 ASSESSMENT — ACTIVITIES OF DAILY LIVING (ADL): ADLS_ACUITY_SCORE: 35

## 2024-02-02 NOTE — ANESTHESIA POSTPROCEDURE EVALUATION
Patient: José Miguel Hightower    Procedure: Procedure(s):  ESOPHAGOGASTRODUODENOSCOPY for stent removal       Anesthesia Type:  MAC    Note:  Disposition: Outpatient   Postop Pain Control: Uneventful            Sign Out: Well controlled pain   PONV: No   Neuro/Psych: Uneventful            Sign Out: Acceptable/Baseline neuro status   Airway/Respiratory: Uneventful            Sign Out: Acceptable/Baseline resp. status   CV/Hemodynamics: Uneventful            Sign Out: Acceptable CV status   Other NRE: NONE   DID A NON-ROUTINE EVENT OCCUR? No           Last vitals:  Vitals Value Taken Time   /101 02/02/24 1340   Temp     Pulse 74 02/02/24 1342   Resp 15 02/02/24 1342   SpO2 95 % 02/02/24 1342   Vitals shown include unfiled device data.    Electronically Signed By: Sukhi Felipe MD  February 2, 2024  1:44 PM

## 2024-02-02 NOTE — ANESTHESIA CARE TRANSFER NOTE
Patient: José Miguel Hightower    Procedure: Procedure(s):  ESOPHAGOGASTRODUODENOSCOPY for stent removal       Diagnosis: Choledocholithiasis [K80.50]  Diagnosis Additional Information: No value filed.    Anesthesia Type:   MAC     Note:    Oropharynx: oropharynx clear of all foreign objects and spontaneously breathing  Level of Consciousness: awake  Oxygen Supplementation: room air    Independent Airway: airway patency satisfactory and stable  Dentition: dentition unchanged  Vital Signs Stable: post-procedure vital signs reviewed and stable  Report to RN Given: handoff report given  Patient transferred to: PACU    Handoff Report: Identifed the Patient, Identified the Reponsible Provider, Reviewed the pertinent medical history, Discussed the surgical course, Reviewed Intra-OP anesthesia mangement and issues during anesthesia, Set expectations for post-procedure period and Allowed opportunity for questions and acknowledgement of understanding      Vitals:  Vitals Value Taken Time   /85 02/02/24 1324   Temp     Pulse 80 02/02/24 1327   Resp 10 02/02/24 1327   SpO2 96 % 02/02/24 1327   Vitals shown include unfiled device data.    Electronically Signed By: DONOVAN Bowden CRNA  February 2, 2024  1:28 PM

## 2024-02-02 NOTE — ANESTHESIA CARE TRANSFER NOTE
Patient: José Miguel Hightower    Procedure: Procedure(s):  ESOPHAGOGASTRODUODENOSCOPY for stent removal       Diagnosis: Choledocholithiasis [K80.50]  Diagnosis Additional Information: No value filed.    Anesthesia Type:   MAC     Note:    Oropharynx: oropharynx clear of all foreign objects and spontaneously breathing  Level of Consciousness: awake  Oxygen Supplementation: room air    Independent Airway: airway patency satisfactory and stable  Dentition: dentition unchanged  Vital Signs Stable: post-procedure vital signs reviewed and stable  Report to RN Given: handoff report given  Patient transferred to: PACU    Handoff Report: Identifed the Patient, Identified the Reponsible Provider, Reviewed the pertinent medical history, Discussed the surgical course, Reviewed Intra-OP anesthesia mangement and issues during anesthesia, Set expectations for post-procedure period and Allowed opportunity for questions and acknowledgement of understanding      Vitals:  Vitals Value Taken Time   BP     Temp     Pulse     Resp     SpO2         Electronically Signed By: DONOVAN Bowden CRNA  February 2, 2024  1:26 PM

## 2024-02-02 NOTE — ANESTHESIA PREPROCEDURE EVALUATION
Anesthesia Pre-Procedure Evaluation    Patient: José Miguel Hightower   MRN: [id number] : 1935        Procedure : Procedure(s):  ESOPHAGOGASTRODUODENOSCOPY for stent removal          Past Medical History:   Diagnosis Date    Arthritis       Past Surgical History:   Procedure Laterality Date    BACK SURGERY      ENDOSCOPIC RETROGRADE CHOLANGIOPANCREATOGRAM N/A 2023    Procedure: ENDOSCOPIC RETROGRADE CHOLANGIOPANCREATOGRAPHY, sphincterotomy, stent placement;  Surgeon: Trell Holliday MD;  Location: [facility] OR    ENDOSCOPIC ULTRASOUND UPPER GASTROINTESTINAL TRACT (GI) N/A 2023    Procedure: ENDOSCOPIC ULTRASOUND, ESOPHAGOSCOPY / UPPER GASTROINTESTINAL TRACT (GI);  Surgeon: Trell Holliday MD;  Location: [facility] OR      No Known Allergies   Social History     Tobacco Use    Smoking status: Never    Smokeless tobacco: Not on file   Substance Use Topics    Alcohol use: Yes     Comment: 2-3 drinks a week      Wt Readings from Last 1 Encounters:   24 91.6 kg (202 lb)        Anesthesia Evaluation   Pt has had prior anesthetic.     No history of anesthetic complications       ROS/MED HX  ENT/Pulmonary:    (-) tobacco use, asthma and sleep apnea   Neurologic:    (-) no seizures and no CVA   Cardiovascular:     (+)  hypertension- -   -  - -                                   (-) CHF, arrhythmias and ICD   METS/Exercise Tolerance:     Hematologic:       Musculoskeletal:   (+)  arthritis,             GI/Hepatic:     (+)          cholecystitis/cholelithiasis,       (-) GERD   Renal/Genitourinary:       Endo:    (-) Type II DM and thyroid disease   Psychiatric/Substance Use:       Infectious Disease:       Malignancy:       Other:            Physical Exam    Airway        Mallampati: II   TM distance: > 3 FB   Neck ROM: full   Mouth opening: > 3 cm    Respiratory Devices and Support         Dental       (+) Minor Abnormalities - some fillings, tiny chips      Cardiovascular   cardiovascular exam normal   "        Pulmonary   pulmonary exam normal                OUTSIDE LABS:  CBC:   Lab Results   Component Value Date    WBC 4.6 12/08/2023    WBC 5.4 12/07/2023    HGB 14.7 12/10/2023    HGB 13.8 12/08/2023    HCT 40.6 12/08/2023    HCT 44.9 12/07/2023     (L) 12/08/2023     (L) 12/07/2023     BMP:   Lab Results   Component Value Date     12/10/2023     12/08/2023    POTASSIUM 4.1 12/10/2023    POTASSIUM 4.2 12/08/2023    CHLORIDE 108 (H) 12/10/2023    CHLORIDE 106 12/08/2023    CO2 25 12/10/2023    CO2 26 12/08/2023    BUN 11.1 12/10/2023    BUN 14.6 12/08/2023    CR 0.82 12/10/2023    CR 0.85 12/08/2023     (H) 12/10/2023    GLC 89 12/08/2023     COAGS:   Lab Results   Component Value Date    INR 1.11 12/09/2023     POC: No results found for: \"BGM\", \"HCG\", \"HCGS\"  HEPATIC:   Lab Results   Component Value Date    ALBUMIN 3.6 12/10/2023    PROTTOTAL 6.4 12/10/2023     (H) 12/10/2023     (H) 12/10/2023    ALKPHOS 264 (H) 12/10/2023    BILITOTAL 2.7 (H) 12/10/2023     OTHER:   Lab Results   Component Value Date    LACT 1.0 12/07/2023    EMILE 8.6 (L) 12/10/2023    MAG 1.9 12/06/2023    LIPASE 17 12/07/2023       Anesthesia Plan    ASA Status:  2       Anesthesia Type: MAC.     - Reason for MAC: immobility needed, straight local not clinically adequate              Consents    Anesthesia Plan(s) and associated risks, benefits, and realistic alternatives discussed. Questions answered and patient/representative(s) expressed understanding.     - Discussed:     - Discussed with:  Patient            Postoperative Care            Comments:               Sukhi Felipe MD    I have reviewed the pertinent notes and labs in the chart from the past 30 days and (re)examined the patient.  Any updates or changes from those notes are reflected in this note.              # Obesity: Estimated body mass index is 31.64 kg/m  as calculated from the following:    Height as of this encounter: " "1.702 m (5' 7\").    Weight as of this encounter: 91.6 kg (202 lb).      "

## 2024-03-27 ENCOUNTER — ANCILLARY PROCEDURE (OUTPATIENT)
Dept: MRI IMAGING | Facility: CLINIC | Age: 89
End: 2024-03-27
Attending: INTERNAL MEDICINE
Payer: COMMERCIAL

## 2024-03-27 DIAGNOSIS — R79.89 ABNORMAL LIVER FUNCTION TESTS: ICD-10-CM

## 2024-03-27 PROCEDURE — 255N000002 HC RX 255 OP 636: Performed by: INTERNAL MEDICINE

## 2024-03-27 PROCEDURE — A9585 GADOBUTROL INJECTION: HCPCS | Performed by: INTERNAL MEDICINE

## 2024-03-27 PROCEDURE — 74183 MRI ABD W/O CNTR FLWD CNTR: CPT

## 2024-03-27 RX ORDER — GADOBUTROL 604.72 MG/ML
9.5 INJECTION INTRAVENOUS ONCE
Status: COMPLETED | OUTPATIENT
Start: 2024-03-27 | End: 2024-03-27

## 2024-03-27 RX ADMIN — GADOBUTROL 9.5 ML: 604.72 INJECTION INTRAVENOUS at 10:31

## 2024-09-12 ENCOUNTER — ANCILLARY PROCEDURE (OUTPATIENT)
Dept: ULTRASOUND IMAGING | Facility: CLINIC | Age: 89
End: 2024-09-12
Attending: INTERNAL MEDICINE
Payer: COMMERCIAL

## 2024-09-12 DIAGNOSIS — K74.69 OTHER CIRRHOSIS OF LIVER (H): ICD-10-CM

## 2024-09-12 PROCEDURE — 76705 ECHO EXAM OF ABDOMEN: CPT

## 2024-10-20 ENCOUNTER — HEALTH MAINTENANCE LETTER (OUTPATIENT)
Age: 89
End: 2024-10-20

## 2025-01-08 NOTE — PROGRESS NOTES
Patient called this afternoon requesting a refill on tramadol please advise. Patient call back number is [phone]   VASCULAR SURGERY    Comfortable today.  No recurrent abdominal pain.  Nuclear medicine biliary study indicative of common bile duct obstruction    Patient's been seen by Dr. Aguilera of GI medicine.  Plan ERCP today.    Impression: #1.  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  Do not feel this is symptomatic due to collateralization.  No vascular intervention planned.     #2.  Elevated bilirubin and abnormal nuclear medicine biliary study.  Possible common bile duct stone.  Workup per GI medicine.  May need cholecystectomy with general surgery eventually.    Discussed with patient.  No further vascular follow-up indicated.      Juan Pablo Perales MD

## 2025-03-25 ENCOUNTER — ANCILLARY PROCEDURE (OUTPATIENT)
Dept: ULTRASOUND IMAGING | Facility: CLINIC | Age: 89
End: 2025-03-25
Attending: INTERNAL MEDICINE
Payer: COMMERCIAL

## 2025-03-25 DIAGNOSIS — K74.69 OTHER CIRRHOSIS OF LIVER (H): ICD-10-CM

## 2025-03-25 PROCEDURE — 76705 ECHO EXAM OF ABDOMEN: CPT

## 2025-07-16 DIAGNOSIS — K74.69 OTHER CIRRHOSIS OF LIVER (H): Primary | ICD-10-CM

## 2025-08-02 ENCOUNTER — OFFICE VISIT (OUTPATIENT)
Dept: URGENT CARE | Facility: URGENT CARE | Age: 89
End: 2025-08-02
Payer: COMMERCIAL

## 2025-08-02 VITALS
DIASTOLIC BLOOD PRESSURE: 71 MMHG | RESPIRATION RATE: 16 BRPM | OXYGEN SATURATION: 98 % | WEIGHT: 200 LBS | HEART RATE: 82 BPM | TEMPERATURE: 97.2 F | BODY MASS INDEX: 31.32 KG/M2 | SYSTOLIC BLOOD PRESSURE: 151 MMHG

## 2025-08-02 DIAGNOSIS — H61.23 BILATERAL IMPACTED CERUMEN: Primary | ICD-10-CM

## 2025-08-02 PROCEDURE — 3078F DIAST BP <80 MM HG: CPT

## 2025-08-02 PROCEDURE — 99203 OFFICE O/P NEW LOW 30 MIN: CPT

## 2025-08-02 PROCEDURE — 3077F SYST BP >= 140 MM HG: CPT

## 2025-08-12 ENCOUNTER — OFFICE VISIT (OUTPATIENT)
Dept: URGENT CARE | Facility: URGENT CARE | Age: 89
End: 2025-08-12
Payer: COMMERCIAL

## 2025-08-12 VITALS
TEMPERATURE: 97 F | BODY MASS INDEX: 31.84 KG/M2 | DIASTOLIC BLOOD PRESSURE: 57 MMHG | HEART RATE: 77 BPM | WEIGHT: 203.3 LBS | RESPIRATION RATE: 19 BRPM | OXYGEN SATURATION: 96 % | SYSTOLIC BLOOD PRESSURE: 135 MMHG

## 2025-08-12 DIAGNOSIS — M70.21 OLECRANON BURSITIS OF RIGHT ELBOW: Primary | ICD-10-CM

## 2025-08-12 PROCEDURE — 99213 OFFICE O/P EST LOW 20 MIN: CPT

## 2025-08-12 PROCEDURE — 3075F SYST BP GE 130 - 139MM HG: CPT

## 2025-08-12 PROCEDURE — 3078F DIAST BP <80 MM HG: CPT

## 2025-09-04 ENCOUNTER — HOSPITAL ENCOUNTER (INPATIENT)
Facility: CLINIC | Age: 89
End: 2025-09-04
Attending: STUDENT IN AN ORGANIZED HEALTH CARE EDUCATION/TRAINING PROGRAM | Admitting: HOSPITALIST
Payer: COMMERCIAL

## 2025-09-04 VITALS
TEMPERATURE: 98.9 F | WEIGHT: 207.9 LBS | HEART RATE: 89 BPM | HEIGHT: 68 IN | DIASTOLIC BLOOD PRESSURE: 81 MMHG | RESPIRATION RATE: 17 BRPM | SYSTOLIC BLOOD PRESSURE: 129 MMHG | OXYGEN SATURATION: 96 % | BODY MASS INDEX: 31.51 KG/M2

## 2025-09-04 DIAGNOSIS — I21.4 NSTEMI (NON-ST ELEVATED MYOCARDIAL INFARCTION) (H): Primary | ICD-10-CM

## 2025-09-04 DIAGNOSIS — R79.89 ELEVATED TROPONIN: ICD-10-CM

## 2025-09-04 DIAGNOSIS — R91.1 PULMONARY NODULE: ICD-10-CM

## 2025-09-04 LAB
ALBUMIN SERPL BCG-MCNC: 3.6 G/DL (ref 3.5–5.2)
ALP SERPL-CCNC: 140 U/L (ref 40–150)
ALT SERPL W P-5'-P-CCNC: 44 U/L (ref 0–70)
ANION GAP SERPL CALCULATED.3IONS-SCNC: 12 MMOL/L (ref 7–15)
AST SERPL W P-5'-P-CCNC: 55 U/L (ref 0–45)
ATRIAL RATE - MUSE: 92 BPM
BASE EXCESS BLDV CALC-SCNC: -2 MMOL/L (ref -3–3)
BASOPHILS # BLD AUTO: <0.03 10E3/UL (ref 0–0.2)
BASOPHILS NFR BLD AUTO: 0.2 %
BI-PLANE LVEF ECHO: NORMAL
BILIRUB DIRECT SERPL-MCNC: 0.25 MG/DL (ref 0–0.3)
BILIRUB SERPL-MCNC: 0.7 MG/DL
BUN SERPL-MCNC: 27.4 MG/DL (ref 8–23)
CALCIUM SERPL-MCNC: 8.5 MG/DL (ref 8.8–10.4)
CHLORIDE SERPL-SCNC: 107 MMOL/L (ref 98–107)
CREAT SERPL-MCNC: 0.89 MG/DL (ref 0.67–1.17)
D DIMER PPP FEU-MCNC: 1.41 UG/ML FEU (ref 0–0.5)
DIASTOLIC BLOOD PRESSURE - MUSE: NORMAL MMHG
EGFRCR SERPLBLD CKD-EPI 2021: 83 ML/MIN/1.73M2
EOSINOPHIL # BLD AUTO: <0.03 10E3/UL (ref 0–0.7)
EOSINOPHIL NFR BLD AUTO: 0 %
ERYTHROCYTE [DISTWIDTH] IN BLOOD BY AUTOMATED COUNT: 14.6 % (ref 10–15)
FLUAV RNA SPEC QL NAA+PROBE: NEGATIVE
FLUBV RNA RESP QL NAA+PROBE: NEGATIVE
GLUCOSE SERPL-MCNC: 135 MG/DL (ref 70–99)
HCO3 BLDV-SCNC: 22 MMOL/L (ref 21–28)
HCO3 SERPL-SCNC: 21 MMOL/L (ref 22–29)
HCT VFR BLD AUTO: 43.1 % (ref 40–53)
HGB BLD-MCNC: 14.5 G/DL (ref 13.3–17.7)
HOLD SPECIMEN: NORMAL
IMM GRANULOCYTES # BLD: 0.04 10E3/UL
IMM GRANULOCYTES NFR BLD: 0.3 %
INTERPRETATION ECG - MUSE: NORMAL
LACTATE BLD-SCNC: 1.2 MMOL/L (ref 0.7–2)
LYMPHOCYTES # BLD AUTO: 0.57 10E3/UL (ref 0.8–5.3)
LYMPHOCYTES NFR BLD AUTO: 4.6 %
MAGNESIUM SERPL-MCNC: 2.1 MG/DL (ref 1.7–2.3)
MCH RBC QN AUTO: 32.2 PG (ref 26.5–33)
MCHC RBC AUTO-ENTMCNC: 33.6 G/DL (ref 31.5–36.5)
MCV RBC AUTO: 95.6 FL (ref 78–100)
MCV RBC AUTO: 95.8 FL (ref 78–100)
MONOCYTES # BLD AUTO: 0.52 10E3/UL (ref 0–1.3)
MONOCYTES NFR BLD AUTO: 4.2 %
NEUTROPHILS # BLD AUTO: 11.27 10E3/UL (ref 1.6–8.3)
NEUTROPHILS NFR BLD AUTO: 90.7 %
NRBC # BLD AUTO: <0.03 10E3/UL
NRBC BLD AUTO-RTO: 0 /100
NT-PROBNP SERPL-MCNC: 3183 PG/ML (ref 0–852)
P AXIS - MUSE: 64 DEGREES
PCO2 BLDV: 38 MM HG (ref 40–50)
PH BLDV: 7.38 [PH] (ref 7.32–7.43)
PLATELET # BLD AUTO: 154 10E3/UL (ref 150–450)
PO2 BLDV: 21 MM HG (ref 25–47)
POTASSIUM SERPL-SCNC: 4 MMOL/L (ref 3.4–5.3)
PR INTERVAL - MUSE: 168 MS
PROCALCITONIN SERPL IA-MCNC: 0.09 NG/ML
PROT SERPL-MCNC: 6.4 G/DL (ref 6.4–8.3)
QRS DURATION - MUSE: 114 MS
QT - MUSE: 354 MS
QTC - MUSE: 437 MS
R AXIS - MUSE: 44 DEGREES
RBC # BLD AUTO: 4.5 10E6/UL (ref 4.4–5.9)
RSV RNA SPEC NAA+PROBE: NEGATIVE
SAO2 % BLDV: 35 % (ref 70–75)
SARS-COV-2 RNA RESP QL NAA+PROBE: NEGATIVE
SODIUM SERPL-SCNC: 140 MMOL/L (ref 135–145)
SYSTOLIC BLOOD PRESSURE - MUSE: NORMAL MMHG
T AXIS - MUSE: -22 DEGREES
TROPONIN T SERPL HS-MCNC: 267 NG/L
TROPONIN T SERPL HS-MCNC: 303 NG/L
UFH PPP CHRO-ACNC: 0.47 IU/ML (ref ?–1.1)
UFH PPP CHRO-ACNC: 0.49 IU/ML (ref ?–1.1)
VENTRICULAR RATE- MUSE: 92 BPM
WBC # BLD AUTO: 12.23 10E3/UL (ref 4–11)
WBC # BLD AUTO: 12.42 10E3/UL (ref 4–11)

## 2025-09-04 PROCEDURE — 85025 COMPLETE CBC W/AUTO DIFF WBC: CPT | Performed by: EMERGENCY MEDICINE

## 2025-09-04 PROCEDURE — 36415 COLL VENOUS BLD VENIPUNCTURE: CPT | Performed by: STUDENT IN AN ORGANIZED HEALTH CARE EDUCATION/TRAINING PROGRAM

## 2025-09-04 PROCEDURE — 84484 ASSAY OF TROPONIN QUANT: CPT | Performed by: STUDENT IN AN ORGANIZED HEALTH CARE EDUCATION/TRAINING PROGRAM

## 2025-09-04 PROCEDURE — 83735 ASSAY OF MAGNESIUM: CPT

## 2025-09-04 PROCEDURE — 255N000002 HC RX 255 OP 636

## 2025-09-04 PROCEDURE — 82803 BLOOD GASES ANY COMBINATION: CPT

## 2025-09-04 PROCEDURE — 250N000011 HC RX IP 250 OP 636

## 2025-09-04 PROCEDURE — 85379 FIBRIN DEGRADATION QUANT: CPT | Performed by: STUDENT IN AN ORGANIZED HEALTH CARE EDUCATION/TRAINING PROGRAM

## 2025-09-04 PROCEDURE — 84145 PROCALCITONIN (PCT): CPT | Performed by: STUDENT IN AN ORGANIZED HEALTH CARE EDUCATION/TRAINING PROGRAM

## 2025-09-04 PROCEDURE — 255N000002 HC RX 255 OP 636: Performed by: STUDENT IN AN ORGANIZED HEALTH CARE EDUCATION/TRAINING PROGRAM

## 2025-09-04 PROCEDURE — 80048 BASIC METABOLIC PNL TOTAL CA: CPT | Performed by: STUDENT IN AN ORGANIZED HEALTH CARE EDUCATION/TRAINING PROGRAM

## 2025-09-04 PROCEDURE — 84155 ASSAY OF PROTEIN SERUM: CPT

## 2025-09-04 PROCEDURE — 250N000009 HC RX 250: Performed by: STUDENT IN AN ORGANIZED HEALTH CARE EDUCATION/TRAINING PROGRAM

## 2025-09-04 PROCEDURE — 87637 SARSCOV2&INF A&B&RSV AMP PRB: CPT | Performed by: STUDENT IN AN ORGANIZED HEALTH CARE EDUCATION/TRAINING PROGRAM

## 2025-09-04 PROCEDURE — 250N000013 HC RX MED GY IP 250 OP 250 PS 637: Performed by: INTERNAL MEDICINE

## 2025-09-04 PROCEDURE — 85520 HEPARIN ASSAY: CPT | Performed by: STUDENT IN AN ORGANIZED HEALTH CARE EDUCATION/TRAINING PROGRAM

## 2025-09-04 PROCEDURE — 250N000011 HC RX IP 250 OP 636: Performed by: INTERNAL MEDICINE

## 2025-09-04 PROCEDURE — 36415 COLL VENOUS BLD VENIPUNCTURE: CPT | Performed by: EMERGENCY MEDICINE

## 2025-09-04 PROCEDURE — 83880 ASSAY OF NATRIURETIC PEPTIDE: CPT | Performed by: STUDENT IN AN ORGANIZED HEALTH CARE EDUCATION/TRAINING PROGRAM

## 2025-09-04 PROCEDURE — 210N000002 HC R&B HEART CARE

## 2025-09-04 PROCEDURE — 85027 COMPLETE CBC AUTOMATED: CPT | Performed by: STUDENT IN AN ORGANIZED HEALTH CARE EDUCATION/TRAINING PROGRAM

## 2025-09-04 PROCEDURE — 250N000013 HC RX MED GY IP 250 OP 250 PS 637: Performed by: STUDENT IN AN ORGANIZED HEALTH CARE EDUCATION/TRAINING PROGRAM

## 2025-09-04 PROCEDURE — 250N000011 HC RX IP 250 OP 636: Performed by: STUDENT IN AN ORGANIZED HEALTH CARE EDUCATION/TRAINING PROGRAM

## 2025-09-04 RX ORDER — ZOLPIDEM TARTRATE 10 MG/1
10 TABLET ORAL
Status: ON HOLD | COMMUNITY

## 2025-09-04 RX ORDER — CEFTRIAXONE 2 G/1
2 INJECTION, POWDER, FOR SOLUTION INTRAMUSCULAR; INTRAVENOUS EVERY 24 HOURS
Status: DISPENSED | OUTPATIENT
Start: 2025-09-04

## 2025-09-04 RX ORDER — AZITHROMYCIN MONOHYDRATE 500 MG/5ML
500 INJECTION, POWDER, LYOPHILIZED, FOR SOLUTION INTRAVENOUS EVERY 24 HOURS
Status: DISPENSED | OUTPATIENT
Start: 2025-09-04

## 2025-09-04 RX ORDER — ESCITALOPRAM OXALATE 5 MG/1
5 TABLET ORAL DAILY PRN
Status: ON HOLD | COMMUNITY

## 2025-09-04 RX ORDER — NALOXONE HYDROCHLORIDE 0.4 MG/ML
0.2 INJECTION, SOLUTION INTRAMUSCULAR; INTRAVENOUS; SUBCUTANEOUS
Status: ACTIVE | OUTPATIENT
Start: 2025-09-04

## 2025-09-04 RX ORDER — ASPIRIN 81 MG/1
243 TABLET, CHEWABLE ORAL ONCE
OUTPATIENT
Start: 2025-09-04

## 2025-09-04 RX ORDER — FUROSEMIDE 10 MG/ML
20 INJECTION INTRAMUSCULAR; INTRAVENOUS EVERY 12 HOURS
Status: DISPENSED | OUTPATIENT
Start: 2025-09-04

## 2025-09-04 RX ORDER — PANTOPRAZOLE SODIUM 40 MG/1
40 TABLET, DELAYED RELEASE ORAL DAILY PRN
Status: ACTIVE | OUTPATIENT
Start: 2025-09-04

## 2025-09-04 RX ORDER — HEPARIN SODIUM 10000 [USP'U]/100ML
0-5000 INJECTION, SOLUTION INTRAVENOUS CONTINUOUS
Status: DISPENSED | OUTPATIENT
Start: 2025-09-04

## 2025-09-04 RX ORDER — ONDANSETRON 2 MG/ML
4 INJECTION INTRAMUSCULAR; INTRAVENOUS EVERY 6 HOURS PRN
Status: ACTIVE | OUTPATIENT
Start: 2025-09-04

## 2025-09-04 RX ORDER — SODIUM CHLORIDE 9 MG/ML
INJECTION, SOLUTION INTRAVENOUS CONTINUOUS
OUTPATIENT
Start: 2025-09-04

## 2025-09-04 RX ORDER — HYDROMORPHONE HCL IN WATER/PF 6 MG/30 ML
0.2 PATIENT CONTROLLED ANALGESIA SYRINGE INTRAVENOUS
Status: ACTIVE | OUTPATIENT
Start: 2025-09-04

## 2025-09-04 RX ORDER — ROSUVASTATIN CALCIUM 10 MG/1
40 TABLET, COATED ORAL DAILY
Status: CANCELLED | OUTPATIENT
Start: 2025-09-05

## 2025-09-04 RX ORDER — ZOLPIDEM TARTRATE 5 MG/1
10 TABLET ORAL
Status: ACTIVE | OUTPATIENT
Start: 2025-09-04

## 2025-09-04 RX ORDER — ONDANSETRON 4 MG/1
4 TABLET, ORALLY DISINTEGRATING ORAL EVERY 6 HOURS PRN
Status: ACTIVE | OUTPATIENT
Start: 2025-09-04

## 2025-09-04 RX ORDER — NALOXONE HYDROCHLORIDE 0.4 MG/ML
0.4 INJECTION, SOLUTION INTRAMUSCULAR; INTRAVENOUS; SUBCUTANEOUS
Status: ACTIVE | OUTPATIENT
Start: 2025-09-04

## 2025-09-04 RX ORDER — ESCITALOPRAM OXALATE 5 MG/1
5 TABLET ORAL DAILY PRN
Status: ACTIVE | OUTPATIENT
Start: 2025-09-04

## 2025-09-04 RX ORDER — POTASSIUM CHLORIDE 1500 MG/1
20 TABLET, EXTENDED RELEASE ORAL
OUTPATIENT
Start: 2025-09-04

## 2025-09-04 RX ORDER — LIDOCAINE 40 MG/G
CREAM TOPICAL
Status: ACTIVE | OUTPATIENT
Start: 2025-09-04

## 2025-09-04 RX ORDER — AMOXICILLIN 250 MG
1 CAPSULE ORAL 2 TIMES DAILY PRN
Status: ACTIVE | OUTPATIENT
Start: 2025-09-04

## 2025-09-04 RX ORDER — ACETAMINOPHEN 650 MG/1
650 SUPPOSITORY RECTAL EVERY 4 HOURS PRN
Status: ACTIVE | OUTPATIENT
Start: 2025-09-04

## 2025-09-04 RX ORDER — ROSUVASTATIN CALCIUM 20 MG/1
20 TABLET, COATED ORAL DAILY
Status: DISPENSED | OUTPATIENT
Start: 2025-09-04

## 2025-09-04 RX ORDER — ACETAMINOPHEN 325 MG/1
650 TABLET ORAL EVERY 4 HOURS PRN
Status: ACTIVE | OUTPATIENT
Start: 2025-09-04

## 2025-09-04 RX ORDER — ASPIRIN 325 MG
325 TABLET ORAL ONCE
OUTPATIENT
Start: 2025-09-04 | End: 2025-09-04

## 2025-09-04 RX ORDER — LIDOCAINE 40 MG/G
CREAM TOPICAL
OUTPATIENT
Start: 2025-09-04

## 2025-09-04 RX ORDER — HYDROMORPHONE HCL IN WATER/PF 6 MG/30 ML
0.4 PATIENT CONTROLLED ANALGESIA SYRINGE INTRAVENOUS
Status: ACTIVE | OUTPATIENT
Start: 2025-09-04

## 2025-09-04 RX ORDER — AMOXICILLIN 250 MG
2 CAPSULE ORAL 2 TIMES DAILY PRN
Status: ACTIVE | OUTPATIENT
Start: 2025-09-04

## 2025-09-04 RX ORDER — LORAZEPAM 0.5 MG/1
0.5 TABLET ORAL
OUTPATIENT
Start: 2025-09-04

## 2025-09-04 RX ORDER — ASPIRIN 325 MG
325 TABLET ORAL ONCE
Status: COMPLETED | OUTPATIENT
Start: 2025-09-04 | End: 2025-09-04

## 2025-09-04 RX ORDER — ONDANSETRON 4 MG/1
4 TABLET, ORALLY DISINTEGRATING ORAL EVERY 8 HOURS PRN
Status: ON HOLD | COMMUNITY

## 2025-09-04 RX ORDER — LORAZEPAM 2 MG/ML
0.5 INJECTION INTRAMUSCULAR
OUTPATIENT
Start: 2025-09-04

## 2025-09-04 RX ORDER — OMEPRAZOLE 20 MG/1
40 CAPSULE, DELAYED RELEASE ORAL DAILY PRN
Status: DISCONTINUED | OUTPATIENT
Start: 2025-09-04 | End: 2025-09-04

## 2025-09-04 RX ADMIN — FUROSEMIDE 20 MG: 10 INJECTION, SOLUTION INTRAMUSCULAR; INTRAVENOUS at 17:46

## 2025-09-04 RX ADMIN — IOHEXOL 78 ML: 350 INJECTION, SOLUTION INTRAVENOUS at 08:32

## 2025-09-04 RX ADMIN — CEFTRIAXONE SODIUM 2 G: 2 INJECTION, POWDER, FOR SOLUTION INTRAMUSCULAR; INTRAVENOUS at 12:20

## 2025-09-04 RX ADMIN — SODIUM CHLORIDE 100 ML: 9 INJECTION, SOLUTION INTRAVENOUS at 08:32

## 2025-09-04 RX ADMIN — AZITHROMYCIN MONOHYDRATE 500 MG: 500 INJECTION, POWDER, LYOPHILIZED, FOR SOLUTION INTRAVENOUS at 13:05

## 2025-09-04 RX ADMIN — ROSUVASTATIN CALCIUM 20 MG: 10 TABLET, FILM COATED ORAL at 12:12

## 2025-09-04 RX ADMIN — HEPARIN SODIUM 1100 UNITS/HR: 10000 INJECTION, SOLUTION INTRAVENOUS at 08:04

## 2025-09-04 RX ADMIN — ASPIRIN 325 MG: 325 TABLET, FILM COATED ORAL at 07:32

## 2025-09-04 RX ADMIN — PERFLUTREN 10 ML (DILUTED): 6.52 INJECTION, SUSPENSION INTRAVENOUS at 14:30

## 2025-09-04 ASSESSMENT — ACTIVITIES OF DAILY LIVING (ADL)
ADLS_ACUITY_SCORE: 59
ADLS_ACUITY_SCORE: 41
ADLS_ACUITY_SCORE: 59
ADLS_ACUITY_SCORE: 59
ADLS_ACUITY_SCORE: 41
ADLS_ACUITY_SCORE: 59
ADLS_ACUITY_SCORE: 41
ADLS_ACUITY_SCORE: 59
ADLS_ACUITY_SCORE: 61
ADLS_ACUITY_SCORE: 59
ADLS_ACUITY_SCORE: 41

## (undated) DEVICE — ENDO BITE BLOCK 60 MAXI LF 00712804

## (undated) DEVICE — DECANTER BAG 2002S

## (undated) DEVICE — Device

## (undated) DEVICE — ESU GROUND PAD ADULT W/CORD E7507

## (undated) DEVICE — ENDO CATH BALLOON EXTRACTOR PRO RX 09-12MM 4700

## (undated) DEVICE — SYR 10ML LL W/O NDL 302995

## (undated) DEVICE — WIRE GUIDE TRACER METRO DIRECT .021"X480CM STR TIP G26862

## (undated) DEVICE — SOL NACL 0.9% IRRIG 1000ML BOTTLE 2F7124

## (undated) DEVICE — INTR ENDOSCOPIC STENT FUSION OASIS 09.0FRX200CM

## (undated) DEVICE — SOL WATER IRRIG 1000ML BOTTLE 2F7114

## (undated) RX ORDER — ONDANSETRON 2 MG/ML
INJECTION INTRAMUSCULAR; INTRAVENOUS
Status: DISPENSED
Start: 2023-12-09

## (undated) RX ORDER — FENTANYL CITRATE 50 UG/ML
INJECTION, SOLUTION INTRAMUSCULAR; INTRAVENOUS
Status: DISPENSED
Start: 2023-12-09

## (undated) RX ORDER — CEFAZOLIN SODIUM/WATER 2 G/20 ML
SYRINGE (ML) INTRAVENOUS
Status: DISPENSED
Start: 2023-12-09

## (undated) RX ORDER — HYDRALAZINE HYDROCHLORIDE 20 MG/ML
INJECTION INTRAMUSCULAR; INTRAVENOUS
Status: DISPENSED
Start: 2023-12-09

## (undated) RX ORDER — DEXAMETHASONE SODIUM PHOSPHATE 4 MG/ML
INJECTION, SOLUTION INTRA-ARTICULAR; INTRALESIONAL; INTRAMUSCULAR; INTRAVENOUS; SOFT TISSUE
Status: DISPENSED
Start: 2023-12-09

## (undated) RX ORDER — PROPOFOL 10 MG/ML
INJECTION, EMULSION INTRAVENOUS
Status: DISPENSED
Start: 2023-12-09

## (undated) RX ORDER — LABETALOL HYDROCHLORIDE 5 MG/ML
INJECTION, SOLUTION INTRAVENOUS
Status: DISPENSED
Start: 2023-12-09